# Patient Record
Sex: FEMALE | Race: BLACK OR AFRICAN AMERICAN | NOT HISPANIC OR LATINO
[De-identification: names, ages, dates, MRNs, and addresses within clinical notes are randomized per-mention and may not be internally consistent; named-entity substitution may affect disease eponyms.]

---

## 2017-07-12 ENCOUNTER — APPOINTMENT (OUTPATIENT)
Dept: UROLOGY | Facility: CLINIC | Age: 47
End: 2017-07-12

## 2017-10-12 ENCOUNTER — APPOINTMENT (OUTPATIENT)
Dept: UROLOGY | Facility: CLINIC | Age: 47
End: 2017-10-12
Payer: COMMERCIAL

## 2017-10-12 PROCEDURE — 99214 OFFICE O/P EST MOD 30 MIN: CPT

## 2017-10-13 LAB
APPEARANCE: CLEAR
BACTERIA: NEGATIVE
BILIRUBIN URINE: NEGATIVE
BLOOD URINE: NEGATIVE
COLOR: YELLOW
GLUCOSE QUALITATIVE U: NEGATIVE MG/DL
HYALINE CASTS: 0 /LPF
KETONES URINE: NEGATIVE
LEUKOCYTE ESTERASE URINE: NEGATIVE
MICROSCOPIC-UA: NORMAL
NITRITE URINE: NEGATIVE
PH URINE: 5.5
PROTEIN URINE: NEGATIVE MG/DL
RED BLOOD CELLS URINE: 3 /HPF
SPECIFIC GRAVITY URINE: 1.03
SQUAMOUS EPITHELIAL CELLS: 9 /HPF
UROBILINOGEN URINE: NEGATIVE MG/DL
WHITE BLOOD CELLS URINE: 2 /HPF

## 2017-10-14 LAB — BACTERIA UR CULT: NORMAL

## 2017-10-16 LAB — CORE LAB FLUID CYTOLOGY: NORMAL

## 2018-04-13 ENCOUNTER — EMERGENCY (EMERGENCY)
Facility: HOSPITAL | Age: 48
LOS: 1 days | Discharge: HOME | End: 2018-04-13
Attending: EMERGENCY MEDICINE
Payer: COMMERCIAL

## 2018-04-13 VITALS
WEIGHT: 255.07 LBS | RESPIRATION RATE: 17 BRPM | SYSTOLIC BLOOD PRESSURE: 161 MMHG | HEART RATE: 90 BPM | DIASTOLIC BLOOD PRESSURE: 121 MMHG | HEIGHT: 66 IN | TEMPERATURE: 98 F | OXYGEN SATURATION: 99 %

## 2018-04-13 VITALS
SYSTOLIC BLOOD PRESSURE: 133 MMHG | OXYGEN SATURATION: 98 % | HEART RATE: 71 BPM | TEMPERATURE: 99 F | DIASTOLIC BLOOD PRESSURE: 89 MMHG | RESPIRATION RATE: 18 BRPM

## 2018-04-13 DIAGNOSIS — Z98.890 OTHER SPECIFIED POSTPROCEDURAL STATES: Chronic | ICD-10-CM

## 2018-04-13 LAB
ALBUMIN SERPL ELPH-MCNC: 4.1 G/DL — SIGNIFICANT CHANGE UP (ref 3.3–5)
ALP SERPL-CCNC: 104 U/L — SIGNIFICANT CHANGE UP (ref 40–120)
ALT FLD-CCNC: 11 U/L RC — SIGNIFICANT CHANGE UP (ref 10–45)
ANION GAP SERPL CALC-SCNC: 13 MMOL/L — SIGNIFICANT CHANGE UP (ref 5–17)
AST SERPL-CCNC: 18 U/L — SIGNIFICANT CHANGE UP (ref 10–40)
BASOPHILS # BLD AUTO: 0.1 K/UL — SIGNIFICANT CHANGE UP (ref 0–0.2)
BASOPHILS NFR BLD AUTO: 0.7 % — SIGNIFICANT CHANGE UP (ref 0–2)
BILIRUB SERPL-MCNC: 0.3 MG/DL — SIGNIFICANT CHANGE UP (ref 0.2–1.2)
BUN SERPL-MCNC: 12 MG/DL — SIGNIFICANT CHANGE UP (ref 7–23)
CALCIUM SERPL-MCNC: 9.7 MG/DL — SIGNIFICANT CHANGE UP (ref 8.4–10.5)
CHLORIDE SERPL-SCNC: 104 MMOL/L — SIGNIFICANT CHANGE UP (ref 96–108)
CK MB BLD-MCNC: 1.2 % — SIGNIFICANT CHANGE UP (ref 0–3.5)
CK MB CFR SERPL CALC: 1.4 NG/ML — SIGNIFICANT CHANGE UP (ref 0–3.8)
CK SERPL-CCNC: 121 U/L — SIGNIFICANT CHANGE UP (ref 25–170)
CO2 SERPL-SCNC: 24 MMOL/L — SIGNIFICANT CHANGE UP (ref 22–31)
CREAT SERPL-MCNC: 1 MG/DL — SIGNIFICANT CHANGE UP (ref 0.5–1.3)
EOSINOPHIL # BLD AUTO: 0.2 K/UL — SIGNIFICANT CHANGE UP (ref 0–0.5)
EOSINOPHIL NFR BLD AUTO: 2.5 % — SIGNIFICANT CHANGE UP (ref 0–6)
GLUCOSE SERPL-MCNC: 123 MG/DL — HIGH (ref 70–99)
HCG SERPL-ACNC: <2 MIU/ML — LOW (ref 5–24)
HCT VFR BLD CALC: 35.5 % — SIGNIFICANT CHANGE UP (ref 34.5–45)
HGB BLD-MCNC: 11.7 G/DL — SIGNIFICANT CHANGE UP (ref 11.5–15.5)
LYMPHOCYTES # BLD AUTO: 2.2 K/UL — SIGNIFICANT CHANGE UP (ref 1–3.3)
LYMPHOCYTES # BLD AUTO: 26.6 % — SIGNIFICANT CHANGE UP (ref 13–44)
MCHC RBC-ENTMCNC: 29 PG — SIGNIFICANT CHANGE UP (ref 27–34)
MCHC RBC-ENTMCNC: 33 GM/DL — SIGNIFICANT CHANGE UP (ref 32–36)
MCV RBC AUTO: 87.9 FL — SIGNIFICANT CHANGE UP (ref 80–100)
MONOCYTES # BLD AUTO: 0.6 K/UL — SIGNIFICANT CHANGE UP (ref 0–0.9)
MONOCYTES NFR BLD AUTO: 6.7 % — SIGNIFICANT CHANGE UP (ref 2–14)
NEUTROPHILS # BLD AUTO: 5.3 K/UL — SIGNIFICANT CHANGE UP (ref 1.8–7.4)
NEUTROPHILS NFR BLD AUTO: 63.5 % — SIGNIFICANT CHANGE UP (ref 43–77)
PLATELET # BLD AUTO: 244 K/UL — SIGNIFICANT CHANGE UP (ref 150–400)
POTASSIUM SERPL-MCNC: 3.9 MMOL/L — SIGNIFICANT CHANGE UP (ref 3.5–5.3)
POTASSIUM SERPL-SCNC: 3.9 MMOL/L — SIGNIFICANT CHANGE UP (ref 3.5–5.3)
PROT SERPL-MCNC: 7.6 G/DL — SIGNIFICANT CHANGE UP (ref 6–8.3)
RBC # BLD: 4.03 M/UL — SIGNIFICANT CHANGE UP (ref 3.8–5.2)
RBC # FLD: 13.1 % — SIGNIFICANT CHANGE UP (ref 10.3–14.5)
SODIUM SERPL-SCNC: 141 MMOL/L — SIGNIFICANT CHANGE UP (ref 135–145)
TROPONIN T SERPL-MCNC: <0.01 NG/ML — SIGNIFICANT CHANGE UP (ref 0–0.06)
WBC # BLD: 8.3 K/UL — SIGNIFICANT CHANGE UP (ref 3.8–10.5)
WBC # FLD AUTO: 8.3 K/UL — SIGNIFICANT CHANGE UP (ref 3.8–10.5)

## 2018-04-13 PROCEDURE — 99284 EMERGENCY DEPT VISIT MOD MDM: CPT | Mod: 25

## 2018-04-13 PROCEDURE — 71045 X-RAY EXAM CHEST 1 VIEW: CPT | Mod: 26

## 2018-04-13 PROCEDURE — 80053 COMPREHEN METABOLIC PANEL: CPT

## 2018-04-13 PROCEDURE — 84702 CHORIONIC GONADOTROPIN TEST: CPT

## 2018-04-13 PROCEDURE — 99285 EMERGENCY DEPT VISIT HI MDM: CPT

## 2018-04-13 PROCEDURE — 84484 ASSAY OF TROPONIN QUANT: CPT

## 2018-04-13 PROCEDURE — 82553 CREATINE MB FRACTION: CPT

## 2018-04-13 PROCEDURE — 82550 ASSAY OF CK (CPK): CPT

## 2018-04-13 PROCEDURE — 71045 X-RAY EXAM CHEST 1 VIEW: CPT

## 2018-04-13 PROCEDURE — 85027 COMPLETE CBC AUTOMATED: CPT

## 2018-04-13 RX ORDER — IBUPROFEN 200 MG
800 TABLET ORAL ONCE
Qty: 0 | Refills: 0 | Status: COMPLETED | OUTPATIENT
Start: 2018-04-13 | End: 2018-04-13

## 2018-04-13 RX ORDER — AMLODIPINE BESYLATE 2.5 MG/1
10 TABLET ORAL ONCE
Qty: 0 | Refills: 0 | Status: DISCONTINUED | OUTPATIENT
Start: 2018-04-13 | End: 2018-04-13

## 2018-04-13 RX ADMIN — Medication 30 MILLILITER(S): at 18:44

## 2018-04-13 NOTE — ED PROVIDER NOTE - ATTENDING CONTRIBUTION TO CARE
48 yo F with h/o HTN fibromyalgia, vertebral disc disease, presenting with left sided chest pain, sharp, worse with movement of left arm.  No sob or palpitations.  No CP with exertion or activities.  Recent negative stress test (1/2018).  On exam, well appearing in NAD.  Afebrile.  Lungs CTABL. Cardiac nrl s1/s2 rrr no mgr.  Abdomen soft nt/nd.  ECG obtained, NSR, no ischemic changes.      CP unlikely ACS given atypical presentation.  Initial labs obtained including troponin (pain since 9am; single troponin sufficient).  CXR obtained, no consolidation/ptx/effusion.  Low risk for MACE by HEART score; unlikely PE given PERC negative.  Improving with meds in ED; likely costochondritis or other MSK etiology given presentation.  Pt is stable for dc and continued evaluation and management as outpatient.

## 2018-04-13 NOTE — ED PROVIDER NOTE - MEDICAL DECISION MAKING DETAILS
46yo F c/o of left sided chest pain  imp: most likely radiculopathy, r/o cardiac cause   EKG, CXR, Cardiac enzymes, preg test, pain control

## 2018-04-13 NOTE — ED PROVIDER NOTE - MUSCULOSKELETAL MINIMAL EXAM
Decreased abduction of arm. + left sided chest wall  tenderness, +trap tenderness. 5/5 strength,/TENDERNESS

## 2018-04-13 NOTE — ED PROVIDER NOTE - OBJECTIVE STATEMENT
46yo F with pmhx of HTN, fibromyalgia, multiple Herniated discs ( ambulates with rollade or cane- follows up with neuro & pain management), Renal cell carcinoma (s/p left kidney removal), multiple episodes of Costochondritis, presents to ER for Left sided chest pain withy radiates down her left arm since 9am this morning. Patient reports she has had similar pain and usually 48yo F with pmhx of HTN, fibromyalgia, multiple Herniated discs (ambulates with rollade or cane- follows up with neuro & pain management), Renal cell carcinoma (s/p left kidney removal), multiple episodes of Costochondritis, presents to ER for Left sided non-pleuritic chest pain with radiates down her left arm since 9am this morning.   Patient reports pain started when she woke up and has been intermittent.  states arm feels like pins and needles. Patient reports she has had similar pain in the past and usually takes colchicine and it goes away. States it is hard for her to differential new pain vs pain from hernaited disc.  Pain is worse with movement of left arm and palpation. Patient reports she is trying to get pregnant- requesting pregnancy test, follows [facility]-last appointment last week.  Denies fever, chills, SOB, abdominal pain, N/V, diarrhea, vaginal discharge, vaginal bleeding.

## 2018-04-13 NOTE — ED PROVIDER NOTE - PMH
Essential hypertension    Fibromyalgia    Herniated disc, cervical    Renal cell carcinoma of left kidney

## 2018-04-13 NOTE — ED ADULT NURSE NOTE - OBJECTIVE STATEMENT
47F with new diagnosis of HTN comes to ED c/o chest pain and weakness. States pain started at 10AM and radiates down arm. She states her pain has been constant since 10AM. States she went to go to primary but "could not make it". She denies vomiting/dizziness/fever/chills/abdominal pain/urinary symptoms/SOB/palpitations. Intermittent nausea. she states her pain was stabbing at first, now she states it feels numb. States she has back pain, but no different than her typical back pain. EKG performed immediately, and patient placed on cardiac monitor. Family is at bedside. Will continue to monitor.

## 2018-04-13 NOTE — ED ADULT NURSE NOTE - CHPI ED SYMPTOMS NEG
no diaphoresis/no shortness of breath/no chills/no dizziness/no vomiting/no fever/no cough/no syncope

## 2018-05-28 ENCOUNTER — EMERGENCY (EMERGENCY)
Facility: HOSPITAL | Age: 48
LOS: 0 days | Discharge: HOME | End: 2018-05-29
Attending: STUDENT IN AN ORGANIZED HEALTH CARE EDUCATION/TRAINING PROGRAM
Payer: COMMERCIAL

## 2018-05-28 VITALS
HEART RATE: 67 BPM | SYSTOLIC BLOOD PRESSURE: 150 MMHG | TEMPERATURE: 98 F | HEIGHT: 66 IN | OXYGEN SATURATION: 100 % | WEIGHT: 255.07 LBS | DIASTOLIC BLOOD PRESSURE: 105 MMHG | RESPIRATION RATE: 16 BRPM

## 2018-05-28 DIAGNOSIS — Z98.890 OTHER SPECIFIED POSTPROCEDURAL STATES: Chronic | ICD-10-CM

## 2018-05-28 DIAGNOSIS — Z88.0 ALLERGY STATUS TO PENICILLIN: ICD-10-CM

## 2018-05-28 DIAGNOSIS — G89.29 OTHER CHRONIC PAIN: ICD-10-CM

## 2018-05-28 DIAGNOSIS — M25.572 PAIN IN LEFT ANKLE AND JOINTS OF LEFT FOOT: ICD-10-CM

## 2018-05-28 DIAGNOSIS — M25.571 PAIN IN RIGHT ANKLE AND JOINTS OF RIGHT FOOT: ICD-10-CM

## 2018-05-28 DIAGNOSIS — R53.1 WEAKNESS: ICD-10-CM

## 2018-05-28 DIAGNOSIS — M79.7 FIBROMYALGIA: ICD-10-CM

## 2018-05-28 DIAGNOSIS — I10 ESSENTIAL (PRIMARY) HYPERTENSION: ICD-10-CM

## 2018-05-28 PROCEDURE — 99284 EMERGENCY DEPT VISIT MOD MDM: CPT

## 2018-05-28 PROCEDURE — 73610 X-RAY EXAM OF ANKLE: CPT | Mod: 26,50

## 2018-05-28 PROCEDURE — 73562 X-RAY EXAM OF KNEE 3: CPT | Mod: 26,LT

## 2018-05-28 RX ORDER — ACETAMINOPHEN 500 MG
650 TABLET ORAL ONCE
Qty: 0 | Refills: 0 | Status: COMPLETED | OUTPATIENT
Start: 2018-05-28 | End: 2018-05-28

## 2018-05-28 RX ORDER — LABETALOL HCL 100 MG
100 TABLET ORAL ONCE
Qty: 0 | Refills: 0 | Status: COMPLETED | OUTPATIENT
Start: 2018-05-28 | End: 2018-05-28

## 2018-05-28 RX ADMIN — Medication 100 MILLIGRAM(S): at 22:52

## 2018-05-28 RX ADMIN — Medication 650 MILLIGRAM(S): at 22:52

## 2018-05-28 NOTE — ED ADULT TRIAGE NOTE - CHIEF COMPLAINT QUOTE
Patient reports b/l ankle pain and L- knee pain today, unable to walk in the mall as per EMS. hx htn
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
## 2018-05-28 NOTE — ED PROVIDER NOTE - ATTENDING CONTRIBUTION TO CARE
I have seen and examined this patient and agree with the PA's history , exam and plan of care    47 year old female presented with ankle edema (L>R) today which she notice while walking in the mall, she also reports  swelling of the left knee, pt does have a history of bilateral ankle ligament tears worse on the left knee tears, she has had edema in the past but today is worse than usual, her pain is worse with weight bearing, (-) calf pains (-) chest pain (-) sob (-) recent travel (-) hormonal medications, labs, sono, pain control, workup is negative, pt told to rest and elevate her legs, tylenol for pain and to follow up with her pmd

## 2018-05-28 NOTE — ED PROVIDER NOTE - PHYSICAL EXAMINATION
Gen: Alert, NAD  Head: NC, AT, PERRL, EOMI, normal lids/conjunctiva  ENT: B TM WNL, normal hearing, patent oropharynx without erythema/exudate, uvula midline  Neck: +supple, no tenderness/meningismus/JVD, +Trachea midline  Pulm: Bilateral BS, normal resp effort, no wheeze/stridor/retractions  CV: RRR, no M/R/G, +dist pulses  Abd: soft, NT/ND, +BS, no hepatosplenomegaly  Mskel: no erythema/cyanosis, b/l LE without pitting edema, no erythema, full ROM, sensations intact, str 5/5  Skin: no rash  Neuro: AAOx3, no sensory/motor deficits, CN 2-12 intact

## 2018-05-29 VITALS
DIASTOLIC BLOOD PRESSURE: 83 MMHG | TEMPERATURE: 98 F | HEART RATE: 67 BPM | OXYGEN SATURATION: 100 % | RESPIRATION RATE: 18 BRPM | SYSTOLIC BLOOD PRESSURE: 123 MMHG

## 2018-05-29 LAB
ALBUMIN SERPL ELPH-MCNC: 3.5 G/DL — SIGNIFICANT CHANGE UP (ref 3.3–5)
ALP SERPL-CCNC: 104 U/L — SIGNIFICANT CHANGE UP (ref 40–120)
ALT FLD-CCNC: 16 U/L — SIGNIFICANT CHANGE UP (ref 12–78)
ANION GAP SERPL CALC-SCNC: 9 MMOL/L — SIGNIFICANT CHANGE UP (ref 5–17)
APTT BLD: 38.7 SEC — HIGH (ref 27.5–37.4)
AST SERPL-CCNC: 13 U/L — LOW (ref 15–37)
BASOPHILS # BLD AUTO: 0.03 K/UL — SIGNIFICANT CHANGE UP (ref 0–0.2)
BASOPHILS NFR BLD AUTO: 0.4 % — SIGNIFICANT CHANGE UP (ref 0–2)
BILIRUB SERPL-MCNC: 0.1 MG/DL — LOW (ref 0.2–1.2)
BUN SERPL-MCNC: 11 MG/DL — SIGNIFICANT CHANGE UP (ref 7–23)
CALCIUM SERPL-MCNC: 8.8 MG/DL — SIGNIFICANT CHANGE UP (ref 8.5–10.1)
CHLORIDE SERPL-SCNC: 108 MMOL/L — SIGNIFICANT CHANGE UP (ref 96–108)
CO2 SERPL-SCNC: 25 MMOL/L — SIGNIFICANT CHANGE UP (ref 22–31)
CREAT SERPL-MCNC: 0.85 MG/DL — SIGNIFICANT CHANGE UP (ref 0.5–1.3)
EOSINOPHIL # BLD AUTO: 0.28 K/UL — SIGNIFICANT CHANGE UP (ref 0–0.5)
EOSINOPHIL NFR BLD AUTO: 3.4 % — SIGNIFICANT CHANGE UP (ref 0–6)
GLUCOSE SERPL-MCNC: 112 MG/DL — HIGH (ref 70–99)
HCT VFR BLD CALC: 34.3 % — LOW (ref 34.5–45)
HGB BLD-MCNC: 11.1 G/DL — LOW (ref 11.5–15.5)
IMM GRANULOCYTES NFR BLD AUTO: 0.6 % — SIGNIFICANT CHANGE UP (ref 0–1.5)
INR BLD: 0.97 RATIO — SIGNIFICANT CHANGE UP (ref 0.88–1.16)
LYMPHOCYTES # BLD AUTO: 2.95 K/UL — SIGNIFICANT CHANGE UP (ref 1–3.3)
LYMPHOCYTES # BLD AUTO: 35.6 % — SIGNIFICANT CHANGE UP (ref 13–44)
MCHC RBC-ENTMCNC: 28.5 PG — SIGNIFICANT CHANGE UP (ref 27–34)
MCHC RBC-ENTMCNC: 32.4 GM/DL — SIGNIFICANT CHANGE UP (ref 32–36)
MCV RBC AUTO: 87.9 FL — SIGNIFICANT CHANGE UP (ref 80–100)
MONOCYTES # BLD AUTO: 0.57 K/UL — SIGNIFICANT CHANGE UP (ref 0–0.9)
MONOCYTES NFR BLD AUTO: 6.9 % — SIGNIFICANT CHANGE UP (ref 2–14)
NEUTROPHILS # BLD AUTO: 4.4 K/UL — SIGNIFICANT CHANGE UP (ref 1.8–7.4)
NEUTROPHILS NFR BLD AUTO: 53.1 % — SIGNIFICANT CHANGE UP (ref 43–77)
NRBC # BLD: 0 /100 WBCS — SIGNIFICANT CHANGE UP (ref 0–0)
NT-PROBNP SERPL-SCNC: 123 PG/ML — SIGNIFICANT CHANGE UP (ref 0–125)
PLATELET # BLD AUTO: 246 K/UL — SIGNIFICANT CHANGE UP (ref 150–400)
POTASSIUM SERPL-MCNC: 3.7 MMOL/L — SIGNIFICANT CHANGE UP (ref 3.5–5.3)
POTASSIUM SERPL-SCNC: 3.7 MMOL/L — SIGNIFICANT CHANGE UP (ref 3.5–5.3)
PROT SERPL-MCNC: 7.8 GM/DL — SIGNIFICANT CHANGE UP (ref 6–8.3)
PROTHROM AB SERPL-ACNC: 10.6 SEC — SIGNIFICANT CHANGE UP (ref 9.8–12.7)
RBC # BLD: 3.9 M/UL — SIGNIFICANT CHANGE UP (ref 3.8–5.2)
RBC # FLD: 14.8 % — HIGH (ref 10.3–14.5)
SODIUM SERPL-SCNC: 142 MMOL/L — SIGNIFICANT CHANGE UP (ref 135–145)
WBC # BLD: 8.28 K/UL — SIGNIFICANT CHANGE UP (ref 3.8–10.5)
WBC # FLD AUTO: 8.28 K/UL — SIGNIFICANT CHANGE UP (ref 3.8–10.5)

## 2018-05-29 PROCEDURE — 93970 EXTREMITY STUDY: CPT | Mod: 26

## 2018-05-29 RX ORDER — LABETALOL HCL 100 MG
1 TABLET ORAL
Qty: 0 | Refills: 0 | COMMUNITY

## 2018-05-29 RX ORDER — ESOMEPRAZOLE MAGNESIUM 40 MG/1
1 CAPSULE, DELAYED RELEASE ORAL
Qty: 0 | Refills: 0 | COMMUNITY

## 2018-09-10 PROBLEM — C64.2 MALIGNANT NEOPLASM OF LEFT KIDNEY, EXCEPT RENAL PELVIS: Chronic | Status: ACTIVE | Noted: 2018-04-13

## 2018-09-10 PROBLEM — I10 ESSENTIAL (PRIMARY) HYPERTENSION: Chronic | Status: ACTIVE | Noted: 2018-04-13

## 2018-09-10 PROBLEM — M79.7 FIBROMYALGIA: Chronic | Status: ACTIVE | Noted: 2018-04-13

## 2018-09-10 PROBLEM — M50.20 OTHER CERVICAL DISC DISPLACEMENT, UNSPECIFIED CERVICAL REGION: Chronic | Status: ACTIVE | Noted: 2018-04-13

## 2018-09-10 PROBLEM — I10 ESSENTIAL (PRIMARY) HYPERTENSION: Chronic | Status: ACTIVE | Noted: 2018-05-28

## 2018-09-10 PROBLEM — Z87.19 PERSONAL HISTORY OF OTHER DISEASES OF THE DIGESTIVE SYSTEM: Chronic | Status: ACTIVE | Noted: 2018-05-28

## 2018-09-14 ENCOUNTER — APPOINTMENT (OUTPATIENT)
Dept: UROLOGY | Facility: CLINIC | Age: 48
End: 2018-09-14
Payer: COMMERCIAL

## 2018-09-14 PROCEDURE — 99214 OFFICE O/P EST MOD 30 MIN: CPT

## 2018-09-16 LAB
APPEARANCE: CLEAR
BACTERIA: NEGATIVE
BILIRUBIN URINE: NEGATIVE
BLOOD URINE: NEGATIVE
COLOR: YELLOW
GLUCOSE QUALITATIVE U: NEGATIVE MG/DL
HYALINE CASTS: 2 /LPF
KETONES URINE: ABNORMAL
LEUKOCYTE ESTERASE URINE: NEGATIVE
MICROSCOPIC-UA: NORMAL
NITRITE URINE: NEGATIVE
PH URINE: 5.5
PROTEIN URINE: NEGATIVE MG/DL
RED BLOOD CELLS URINE: 2 /HPF
SPECIFIC GRAVITY URINE: 1.02
SQUAMOUS EPITHELIAL CELLS: 3 /HPF
UROBILINOGEN URINE: NEGATIVE MG/DL
WHITE BLOOD CELLS URINE: 2 /HPF

## 2018-09-17 LAB
BACTERIA UR CULT: ABNORMAL
CORE LAB FLUID CYTOLOGY: NORMAL

## 2019-01-10 ENCOUNTER — APPOINTMENT (OUTPATIENT)
Dept: UROLOGY | Facility: CLINIC | Age: 49
End: 2019-01-10

## 2019-03-23 LAB
APPEARANCE: CLEAR
BACTERIA: NEGATIVE
BILIRUBIN URINE: NEGATIVE
BLOOD URINE: NEGATIVE
COLOR: YELLOW
GLUCOSE QUALITATIVE U: NEGATIVE
HYALINE CASTS: 2 /LPF
KETONES URINE: NEGATIVE
LEUKOCYTE ESTERASE URINE: NEGATIVE
MICROSCOPIC-UA: NORMAL
NITRITE URINE: NEGATIVE
PH URINE: 6
PROTEIN URINE: NEGATIVE
RED BLOOD CELLS URINE: 3 /HPF
SPECIFIC GRAVITY URINE: 1.02
SQUAMOUS EPITHELIAL CELLS: 3 /HPF
UROBILINOGEN URINE: NORMAL
WHITE BLOOD CELLS URINE: 1 /HPF

## 2019-03-25 LAB — BACTERIA UR CULT: NORMAL

## 2019-09-05 ENCOUNTER — APPOINTMENT (OUTPATIENT)
Dept: UROLOGY | Facility: CLINIC | Age: 49
End: 2019-09-05

## 2020-01-15 ENCOUNTER — EMERGENCY (EMERGENCY)
Facility: HOSPITAL | Age: 50
LOS: 1 days | Discharge: HOME | End: 2020-01-15
Attending: EMERGENCY MEDICINE
Payer: COMMERCIAL

## 2020-01-15 VITALS
TEMPERATURE: 98 F | HEART RATE: 71 BPM | OXYGEN SATURATION: 99 % | RESPIRATION RATE: 19 BRPM | WEIGHT: 244.93 LBS | SYSTOLIC BLOOD PRESSURE: 138 MMHG | DIASTOLIC BLOOD PRESSURE: 88 MMHG | HEIGHT: 66 IN

## 2020-01-15 VITALS
OXYGEN SATURATION: 100 % | TEMPERATURE: 98 F | RESPIRATION RATE: 18 BRPM | DIASTOLIC BLOOD PRESSURE: 81 MMHG | SYSTOLIC BLOOD PRESSURE: 141 MMHG | HEART RATE: 73 BPM

## 2020-01-15 DIAGNOSIS — Z98.890 OTHER SPECIFIED POSTPROCEDURAL STATES: Chronic | ICD-10-CM

## 2020-01-15 LAB
ALBUMIN SERPL ELPH-MCNC: 4.3 G/DL — SIGNIFICANT CHANGE UP (ref 3.3–5)
ALP SERPL-CCNC: 106 U/L — SIGNIFICANT CHANGE UP (ref 40–120)
ALT FLD-CCNC: 14 U/L — SIGNIFICANT CHANGE UP (ref 10–45)
ANION GAP SERPL CALC-SCNC: 11 MMOL/L — SIGNIFICANT CHANGE UP (ref 5–17)
AST SERPL-CCNC: 15 U/L — SIGNIFICANT CHANGE UP (ref 10–40)
BILIRUB SERPL-MCNC: 0.2 MG/DL — SIGNIFICANT CHANGE UP (ref 0.2–1.2)
BUN SERPL-MCNC: 18 MG/DL — SIGNIFICANT CHANGE UP (ref 7–23)
CALCIUM SERPL-MCNC: 10.2 MG/DL — SIGNIFICANT CHANGE UP (ref 8.4–10.5)
CHLORIDE SERPL-SCNC: 102 MMOL/L — SIGNIFICANT CHANGE UP (ref 96–108)
CO2 SERPL-SCNC: 27 MMOL/L — SIGNIFICANT CHANGE UP (ref 22–31)
CREAT SERPL-MCNC: 0.89 MG/DL — SIGNIFICANT CHANGE UP (ref 0.5–1.3)
GLUCOSE SERPL-MCNC: 146 MG/DL — HIGH (ref 70–99)
HCT VFR BLD CALC: 38.1 % — SIGNIFICANT CHANGE UP (ref 34.5–45)
HGB BLD-MCNC: 11.9 G/DL — SIGNIFICANT CHANGE UP (ref 11.5–15.5)
MCHC RBC-ENTMCNC: 27.9 PG — SIGNIFICANT CHANGE UP (ref 27–34)
MCHC RBC-ENTMCNC: 31.2 GM/DL — LOW (ref 32–36)
MCV RBC AUTO: 89.4 FL — SIGNIFICANT CHANGE UP (ref 80–100)
NRBC # BLD: 0 /100 WBCS — SIGNIFICANT CHANGE UP (ref 0–0)
PLATELET # BLD AUTO: 234 K/UL — SIGNIFICANT CHANGE UP (ref 150–400)
POTASSIUM SERPL-MCNC: 4.2 MMOL/L — SIGNIFICANT CHANGE UP (ref 3.5–5.3)
POTASSIUM SERPL-SCNC: 4.2 MMOL/L — SIGNIFICANT CHANGE UP (ref 3.5–5.3)
PROT SERPL-MCNC: 8 G/DL — SIGNIFICANT CHANGE UP (ref 6–8.3)
RBC # BLD: 4.26 M/UL — SIGNIFICANT CHANGE UP (ref 3.8–5.2)
RBC # FLD: 13.8 % — SIGNIFICANT CHANGE UP (ref 10.3–14.5)
SODIUM SERPL-SCNC: 140 MMOL/L — SIGNIFICANT CHANGE UP (ref 135–145)
TROPONIN T, HIGH SENSITIVITY RESULT: <6 NG/L — SIGNIFICANT CHANGE UP (ref 0–51)
WBC # BLD: 8.88 K/UL — SIGNIFICANT CHANGE UP (ref 3.8–10.5)
WBC # FLD AUTO: 8.88 K/UL — SIGNIFICANT CHANGE UP (ref 3.8–10.5)

## 2020-01-15 PROCEDURE — 93010 ELECTROCARDIOGRAM REPORT: CPT

## 2020-01-15 PROCEDURE — 71046 X-RAY EXAM CHEST 2 VIEWS: CPT

## 2020-01-15 PROCEDURE — 99285 EMERGENCY DEPT VISIT HI MDM: CPT

## 2020-01-15 PROCEDURE — 85027 COMPLETE CBC AUTOMATED: CPT

## 2020-01-15 PROCEDURE — 84484 ASSAY OF TROPONIN QUANT: CPT

## 2020-01-15 PROCEDURE — 71046 X-RAY EXAM CHEST 2 VIEWS: CPT | Mod: 26

## 2020-01-15 PROCEDURE — 80053 COMPREHEN METABOLIC PANEL: CPT

## 2020-01-15 PROCEDURE — 99284 EMERGENCY DEPT VISIT MOD MDM: CPT | Mod: 25

## 2020-01-15 PROCEDURE — 93005 ELECTROCARDIOGRAM TRACING: CPT

## 2020-01-15 RX ORDER — SODIUM CHLORIDE 9 MG/ML
1000 INJECTION INTRAMUSCULAR; INTRAVENOUS; SUBCUTANEOUS ONCE
Refills: 0 | Status: COMPLETED | OUTPATIENT
Start: 2020-01-15 | End: 2020-01-15

## 2020-01-15 RX ORDER — ACETAMINOPHEN 500 MG
975 TABLET ORAL ONCE
Refills: 0 | Status: COMPLETED | OUTPATIENT
Start: 2020-01-15 | End: 2020-01-15

## 2020-01-15 RX ADMIN — Medication 975 MILLIGRAM(S): at 22:41

## 2020-01-15 RX ADMIN — SODIUM CHLORIDE 1000 MILLILITER(S): 9 INJECTION INTRAMUSCULAR; INTRAVENOUS; SUBCUTANEOUS at 23:33

## 2020-01-15 NOTE — ED PROVIDER NOTE - NSFOLLOWUPINSTRUCTIONS_ED_ALL_ED_FT
1- Rest, drink plenty of fluids  2- Follow up with your doctor or our medicine clinic to discuss you hypertensive medications [phone]  3- Tylenol as needed for pain  4- If you have any worsening pain, shortness of breath, numbness, weakness, or any other concerns return to ER immediately

## 2020-01-15 NOTE — ED PROVIDER NOTE - ENMT, MLM
Airway patent, Nasal mucosa clear. Mouth with normal mucosa. Throat has no vesicles, no oropharyngeal exudates and uvula is midline.
[FreeTextEntry1] : WBC 3,100, Hgb 15.0, Hct 49.1, Plts 80,000, Diff  66P, 17L, 16M, 1Eo, , ANC 2,100\par \par \par \par 06/19/19\par CMP:  Glu 168, BUN 44, Creatinine 1.88, eGFR 33\par \par Mg 2.0\par Cyclosporine: 193\par

## 2020-01-15 NOTE — ED PROVIDER NOTE - OBJECTIVE STATEMENT
49 y.o. female coming in with some fatigue, left sided chest discomfort and a headache.  Pt was at PT today, was not straining when she did not feel well, they checked her pressure and it was 170/110 they told her to come to the ED.  Upon arriving in triage pt started with some left sided chest soreness worse with palpation, typical of her costochondritis pain (nothing makes it better).  Also Is noting some fatigue and light-headiness.  No cough, no fevers, no chills.  + recent URI.  Took a dose of her Labetalol prior to arrival which helped her pressure.

## 2020-01-15 NOTE — ED PROVIDER NOTE - ATTENDING CONTRIBUTION TO CARE
attending Benedict: 49yF h/o pericarditis, solitary kidney, costochondritis, HTN no longer on antihypertensives p/w several hours generalized weakness and mild L sided chest pain. Reports elevated BP earlier today during physical therapy after which she took her labetalol. Reports pain similar to prior episode of costochondritis. +preceding URI. Will obtain ekg, place on tele, labs including trop, cxr, symptomatic treatment, reassess

## 2020-01-15 NOTE — ED PROVIDER NOTE - PMH
Essential hypertension    Fibromyalgia    H/O gastroesophageal reflux (GERD)    Herniated disc, cervical    HTN (hypertension)    Renal cell carcinoma of left kidney

## 2020-01-15 NOTE — ED ADULT NURSE NOTE - OBJECTIVE STATEMENT
48y/o female with history of renal carcinoma s/p removal of kidney, HTN, walked into ED a&ox3 c/o high blood pressure. Patient reports she was at physical therapy today when she suddenly felt "unwell". States at this time her BP was 170/110. Patient currently c/o of some left sided chest discomfort and HA. Denies SOB, diaphoresis, fever, cough, recent travel, swelling to legs, dizziness, numbness/tingling. Lungs clear b/l. Abd soft, nontender, nondistended. MENCHACA x4. EKG done in triage, patient on CM. MD Mcmullen at bedside.

## 2020-01-15 NOTE — ED ADULT NURSE NOTE - NSIMPLEMENTINTERV_GEN_ALL_ED
Implemented All Fall Risk Interventions:  [place] to call system. Call bell, personal items and telephone within reach. Instruct patient to call for assistance. Room bathroom lighting operational. Non-slip footwear when patient is off stretcher. Physically safe environment: no spills, clutter or unnecessary equipment. Stretcher in lowest position, wheels locked, appropriate side rails in place. Provide visual cue, wrist band, yellow gown, etc. Monitor gait and stability. Monitor for mental status changes and reorient to person, place, and time. Review medications for side effects contributing to fall risk. Reinforce activity limits and safety measures with patient and family.

## 2020-01-15 NOTE — ED PROVIDER NOTE - PATIENT PORTAL LINK FT
You can access the FollowMyHealth Patient Portal offered by [facility] by registering at the following website: [email or web address]. By joining [facility]’s FollowMyHealth portal, you will also be able to view your health information using other applications (apps) compatible with our system.

## 2020-01-29 ENCOUNTER — APPOINTMENT (OUTPATIENT)
Dept: UROLOGY | Facility: CLINIC | Age: 50
End: 2020-01-29
Payer: COMMERCIAL

## 2020-01-29 DIAGNOSIS — N30.90 CYSTITIS, UNSPECIFIED W/OUT HEMATURIA: ICD-10-CM

## 2020-01-29 PROCEDURE — 99214 OFFICE O/P EST MOD 30 MIN: CPT

## 2020-01-29 NOTE — PHYSICAL EXAM
[General Appearance - Well Developed] : well developed [Normal Appearance] : normal appearance [General Appearance - Well Nourished] : well nourished [General Appearance - In No Acute Distress] : no acute distress [Well Groomed] : well groomed [Abdomen Soft] : soft [Costovertebral Angle Tenderness] : no ~M costovertebral angle tenderness [Abdomen Tenderness] : non-tender [Urinary Bladder Findings] : the bladder was normal on palpation [] : no respiratory distress [Edema] : no peripheral edema [Exaggerated Use Of Accessory Muscles For Inspiration] : no accessory muscle use [Oriented To Time, Place, And Person] : oriented to person, place, and time [Respiration, Rhythm And Depth] : normal respiratory rhythm and effort [Mood] : the mood was normal [Affect] : the affect was normal [Not Anxious] : not anxious [Normal Station and Gait] : the gait and station were normal for the patient's age [No Palpable Adenopathy] : no palpable adenopathy [No Focal Deficits] : no focal deficits

## 2020-01-30 LAB
APPEARANCE: CLEAR
BACTERIA UR CULT: NORMAL
BACTERIA: NEGATIVE
BILIRUBIN URINE: NEGATIVE
BLOOD URINE: NEGATIVE
COLOR: NORMAL
GLUCOSE QUALITATIVE U: NEGATIVE
HYALINE CASTS: 3 /LPF
KETONES URINE: NEGATIVE
LEUKOCYTE ESTERASE URINE: NEGATIVE
MICROSCOPIC-UA: NORMAL
NITRITE URINE: NEGATIVE
PH URINE: 6
PROTEIN URINE: NORMAL
RED BLOOD CELLS URINE: 1 /HPF
SPECIFIC GRAVITY URINE: 1.02
SQUAMOUS EPITHELIAL CELLS: 2 /HPF
UROBILINOGEN URINE: NORMAL
WHITE BLOOD CELLS URINE: 1 /HPF

## 2020-10-13 ENCOUNTER — APPOINTMENT (OUTPATIENT)
Dept: UROLOGY | Facility: CLINIC | Age: 50
End: 2020-10-13
Payer: COMMERCIAL

## 2020-10-13 VITALS
WEIGHT: 242 LBS | TEMPERATURE: 98 F | BODY MASS INDEX: 41.32 KG/M2 | HEIGHT: 64 IN | HEART RATE: 81 BPM | SYSTOLIC BLOOD PRESSURE: 127 MMHG | DIASTOLIC BLOOD PRESSURE: 84 MMHG

## 2020-10-13 PROCEDURE — 99214 OFFICE O/P EST MOD 30 MIN: CPT

## 2020-10-13 NOTE — ED ADULT NURSE NOTE - CAS TRG GEN SKIN COLOR
[facility] Emergency Dept  [address]  [facility] [address]  Phone: [phone]  Fax: [phone]                                    Moriah Hudson   MRN: [id number]    Department: [facility] Emergency Dept   Date of Visit: 10/13/2020           After Visit Summary Signature Page    I have received my discharge instructions, and my questions have been answered. I have discussed any challenges I see with this plan with the nurse or doctor.    ..........................................................................................................................................  Patient/Patient Representative Signature      ..........................................................................................................................................  Patient Representative Print Name and Relationship to Patient    ..................................................               ................................................  Date                                   Time    ..........................................................................................................................................  Reviewed by Signature/Title    ...................................................              ..............................................  Date                                               Time          22EPIC Rev 08/18       
Normal for race

## 2021-06-16 ENCOUNTER — APPOINTMENT (OUTPATIENT)
Dept: UROLOGY | Facility: CLINIC | Age: 51
End: 2021-06-16
Payer: MEDICARE

## 2021-06-16 PROCEDURE — 99072 ADDL SUPL MATRL&STAF TM PHE: CPT

## 2021-06-16 PROCEDURE — 99213 OFFICE O/P EST LOW 20 MIN: CPT

## 2021-06-17 LAB
APPEARANCE: CLEAR
BACTERIA: NEGATIVE
BILIRUBIN URINE: NEGATIVE
BLOOD URINE: NEGATIVE
COLOR: NORMAL
GLUCOSE QUALITATIVE U: NEGATIVE
HYALINE CASTS: 2 /LPF
KETONES URINE: NEGATIVE
LEUKOCYTE ESTERASE URINE: ABNORMAL
MICROSCOPIC-UA: NORMAL
NITRITE URINE: NEGATIVE
PH URINE: 6
PROTEIN URINE: NORMAL
RED BLOOD CELLS URINE: 2 /HPF
SPECIFIC GRAVITY URINE: 1.03
SQUAMOUS EPITHELIAL CELLS: 5 /HPF
UROBILINOGEN URINE: NORMAL
WHITE BLOOD CELLS URINE: 2 /HPF

## 2021-06-21 LAB — URINE CYTOLOGY: NORMAL

## 2022-05-19 NOTE — ED PROVIDER NOTE - PR
993 Complex Repair And Epidermal Autograft Text: The defect edges were debeveled with a #15 scalpel blade.  The primary defect was closed partially with a complex linear closure.  Given the location of the defect, shape of the defect and the proximity to free margins an epidermal autograft was deemed most appropriate to repair the remaining defect.  The graft was trimmed to fit the size of the remaining defect.  The graft was then placed in the primary defect, oriented appropriately, and sutured into place.

## 2022-06-15 ENCOUNTER — APPOINTMENT (OUTPATIENT)
Dept: UROLOGY | Facility: CLINIC | Age: 52
End: 2022-06-15
Payer: MEDICARE

## 2022-06-15 LAB
APPEARANCE: CLEAR
BACTERIA: NEGATIVE
BILIRUBIN URINE: NEGATIVE
BLOOD URINE: NEGATIVE
COLOR: NORMAL
GLUCOSE QUALITATIVE U: NEGATIVE
HYALINE CASTS: 1 /LPF
KETONES URINE: NEGATIVE
LEUKOCYTE ESTERASE URINE: NEGATIVE
MICROSCOPIC-UA: NORMAL
NITRITE URINE: NEGATIVE
PH URINE: 6
PROTEIN URINE: NEGATIVE
RED BLOOD CELLS URINE: 1 /HPF
SPECIFIC GRAVITY URINE: 1.02
SQUAMOUS EPITHELIAL CELLS: 3 /HPF
UROBILINOGEN URINE: NORMAL
WHITE BLOOD CELLS URINE: 2 /HPF

## 2022-06-15 PROCEDURE — 99213 OFFICE O/P EST LOW 20 MIN: CPT

## 2022-06-16 LAB — BACTERIA UR CULT: NORMAL

## 2022-07-19 ENCOUNTER — APPOINTMENT (OUTPATIENT)
Dept: ORTHOPEDIC SURGERY | Facility: CLINIC | Age: 52
End: 2022-07-19

## 2022-07-19 VITALS — BODY MASS INDEX: 42.75 KG/M2 | WEIGHT: 266 LBS | HEIGHT: 66 IN

## 2022-07-19 DIAGNOSIS — S83.92XA SPRAIN OF UNSPECIFIED SITE OF LEFT KNEE, INITIAL ENCOUNTER: ICD-10-CM

## 2022-07-19 DIAGNOSIS — S83.91XA SPRAIN OF UNSPECIFIED SITE OF RIGHT KNEE, INITIAL ENCOUNTER: ICD-10-CM

## 2022-07-19 DIAGNOSIS — I10 ESSENTIAL (PRIMARY) HYPERTENSION: ICD-10-CM

## 2022-07-19 PROCEDURE — 20611 DRAIN/INJ JOINT/BURSA W/US: CPT | Mod: 50

## 2022-07-19 PROCEDURE — 99214 OFFICE O/P EST MOD 30 MIN: CPT | Mod: 25

## 2022-07-19 PROCEDURE — 73562 X-RAY EXAM OF KNEE 3: CPT | Mod: 50

## 2022-07-19 NOTE — HISTORY OF PRESENT ILLNESS
[10] : 10 [Constant] : constant [de-identified] : 7/19/22: bilateral knee pain back again.  right shoulder pain worse again. no new injury. [FreeTextEntry1] : both knees, both shoulders [de-identified] : physical therapy

## 2022-07-19 NOTE — PHYSICAL EXAM
[4 ___] : forward flexion 4[unfilled]/5 [Left] : left knee [NL (0)] : extension 0 degrees [4___] : quadriceps 4[unfilled]/5 [5___] : hamstring 5[unfilled]/5 [Right] : right knee [Degenerative change] : Degenerative change [] : no swelling [TWNoteComboBox7] : flexion 120 degrees
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
## 2022-07-19 NOTE — PROCEDURE
[Large Joint Injection] : Large joint injection [Bilateral] : bilaterally of the [Knee] : knee [Pain] : pain [Alcohol] : alcohol [Betadine] : betadine [Orthovisc] : Orthovisc [#1] : series #1 [] : Patient tolerated procedure well [Call if redness, pain or fever occur] : call if redness, pain or fever occur [Apply ice for 15min out of every hour for the next 12-24 hours as tolerated] : apply ice for 15 minutes out of every hour for the next 12-24 hours as tolerated [Patient was advised to rest the joint(s) for ____ days] : patient was advised to rest the joint(s) for [unfilled] days [Previous OTC use and PT nontherapeutic] : patient has tried OTC's including aspirin, Ibuprofen, Aleve, etc or prescription NSAIDS, and/or exercises at home and/or physical therapy without satisfactory response [Patient had decreased mobility in the joint] : patient had decreased mobility in the joint [Risks, benefits, alternatives discussed / Verbal consent obtained] : the risks benefits, and alternatives have been discussed, and verbal consent was obtained [Prior failure or difficult injection] : prior failure or difficult injection [All ultrasound images have been permanently captured and stored accordingly in our picture archiving and communication system] : All ultrasound images have been permanently captured and stored accordingly in our picture archiving and communication system [Visualization of the needle and placement of injection was performed without complication] : visualization of the needle and placement of injection was performed without complication [Inflammation] : inflammation

## 2023-03-21 ENCOUNTER — EMERGENCY (EMERGENCY)
Facility: HOSPITAL | Age: 53
LOS: 1 days | Discharge: HOME | End: 2023-03-21
Attending: EMERGENCY MEDICINE
Payer: COMMERCIAL

## 2023-03-21 VITALS
TEMPERATURE: 99 F | WEIGHT: 268.08 LBS | DIASTOLIC BLOOD PRESSURE: 106 MMHG | OXYGEN SATURATION: 99 % | HEART RATE: 66 BPM | RESPIRATION RATE: 20 BRPM | HEIGHT: 66 IN | SYSTOLIC BLOOD PRESSURE: 149 MMHG

## 2023-03-21 VITALS
SYSTOLIC BLOOD PRESSURE: 137 MMHG | HEART RATE: 70 BPM | RESPIRATION RATE: 16 BRPM | OXYGEN SATURATION: 98 % | TEMPERATURE: 99 F | DIASTOLIC BLOOD PRESSURE: 81 MMHG

## 2023-03-21 DIAGNOSIS — Z98.890 OTHER SPECIFIED POSTPROCEDURAL STATES: Chronic | ICD-10-CM

## 2023-03-21 PROCEDURE — 72125 CT NECK SPINE W/O DYE: CPT | Mod: 26,MA

## 2023-03-21 PROCEDURE — 73502 X-RAY EXAM HIP UNI 2-3 VIEWS: CPT | Mod: 26,RT

## 2023-03-21 PROCEDURE — 73502 X-RAY EXAM HIP UNI 2-3 VIEWS: CPT

## 2023-03-21 PROCEDURE — 74176 CT ABD & PELVIS W/O CONTRAST: CPT | Mod: MA

## 2023-03-21 PROCEDURE — 72125 CT NECK SPINE W/O DYE: CPT | Mod: MA

## 2023-03-21 PROCEDURE — 99284 EMERGENCY DEPT VISIT MOD MDM: CPT

## 2023-03-21 PROCEDURE — 72128 CT CHEST SPINE W/O DYE: CPT | Mod: 26,MA

## 2023-03-21 PROCEDURE — 72131 CT LUMBAR SPINE W/O DYE: CPT | Mod: 26,MA

## 2023-03-21 PROCEDURE — 71250 CT THORAX DX C-: CPT | Mod: MA

## 2023-03-21 PROCEDURE — 73552 X-RAY EXAM OF FEMUR 2/>: CPT

## 2023-03-21 PROCEDURE — 99284 EMERGENCY DEPT VISIT MOD MDM: CPT | Mod: 25

## 2023-03-21 PROCEDURE — 73552 X-RAY EXAM OF FEMUR 2/>: CPT | Mod: 26,RT

## 2023-03-21 PROCEDURE — 71250 CT THORAX DX C-: CPT | Mod: 26,MA

## 2023-03-21 PROCEDURE — 74176 CT ABD & PELVIS W/O CONTRAST: CPT | Mod: 26,MA

## 2023-03-21 PROCEDURE — 70450 CT HEAD/BRAIN W/O DYE: CPT | Mod: 26,MA

## 2023-03-21 PROCEDURE — 70450 CT HEAD/BRAIN W/O DYE: CPT | Mod: MA

## 2023-03-21 RX ORDER — LIDOCAINE 4 G/100G
1 CREAM TOPICAL ONCE
Refills: 0 | Status: COMPLETED | OUTPATIENT
Start: 2023-03-21 | End: 2023-03-21

## 2023-03-21 RX ORDER — CYCLOBENZAPRINE HYDROCHLORIDE 10 MG/1
10 TABLET, FILM COATED ORAL ONCE
Refills: 0 | Status: COMPLETED | OUTPATIENT
Start: 2023-03-21 | End: 2023-03-21

## 2023-03-21 RX ORDER — CYCLOBENZAPRINE HYDROCHLORIDE 10 MG/1
1 TABLET, FILM COATED ORAL
Qty: 9 | Refills: 0
Start: 2023-03-21 | End: 2023-03-23

## 2023-03-21 RX ORDER — ACETAMINOPHEN 500 MG
975 TABLET ORAL ONCE
Refills: 0 | Status: COMPLETED | OUTPATIENT
Start: 2023-03-21 | End: 2023-03-21

## 2023-03-21 RX ADMIN — Medication 975 MILLIGRAM(S): at 18:16

## 2023-03-21 RX ADMIN — LIDOCAINE 1 PATCH: 4 CREAM TOPICAL at 19:45

## 2023-03-21 RX ADMIN — Medication 975 MILLIGRAM(S): at 17:02

## 2023-03-21 RX ADMIN — LIDOCAINE 1 PATCH: 4 CREAM TOPICAL at 18:14
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
## 2023-03-21 NOTE — ED PROVIDER NOTE - NSICDXPASTMEDICALHX_GEN_ALL_CORE_FT
PAST MEDICAL HISTORY:  Essential hypertension     Fibromyalgia     H/O gastroesophageal reflux (GERD)     Herniated disc, cervical     HTN (hypertension)     Renal cell carcinoma of left kidney
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
## 2023-03-21 NOTE — ED PROVIDER NOTE - PATIENT PORTAL LINK FT
You can access the FollowMyHealth Patient Portal offered by [facility] by registering at the following website: [email or web address]. By joining [facility]’s FollowMyHealth portal, you will also be able to view your health information using other applications (apps) compatible with our system.

## 2023-03-21 NOTE — ED PROVIDER NOTE - NS ED ATTENDING STATEMENT MOD
This was a shared visit with the HERB. I reviewed and verified the documentation and independently performed the documented:
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
## 2023-03-21 NOTE — ED ADULT NURSE NOTE - NSIMPLEMENTINTERV_GEN_ALL_ED
Susan Hahn:  I spoke with pt today. He was recently D/C'd from [facility]. Requesting a refill of cough med. Implemented All Fall Risk Interventions:  [place] to call system. Call bell, personal items and telephone within reach. Instruct patient to call for assistance. Room bathroom lighting operational. Non-slip footwear when patient is off stretcher. Physically safe environment: no spills, clutter or unnecessary equipment. Stretcher in lowest position, wheels locked, appropriate side rails in place. Provide visual cue, wrist band, yellow gown, etc. Monitor gait and stability. Monitor for mental status changes and reorient to person, place, and time. Review medications for side effects contributing to fall risk. Reinforce activity limits and safety measures with patient and family.
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
## 2023-03-21 NOTE — ED PROVIDER NOTE - CPE EDP [facility]
Subjective  Asif Rosenberg is a 63year old male. Chief Complaint   Patient presents with   â¢ Sinus Problem   â¢ Convey Results     Diabetes   He presents for his follow-up diabetic visit. He has type 2 diabetes mellitus. His disease course has been stable. There are no hypoglycemic associated symptoms. Pertinent negatives for hypoglycemia include no headaches. There are no diabetic associated symptoms. Pertinent negatives for diabetes include no blurred vision and no chest pain. There are no hypoglycemic complications. Hypertension   This is a chronic problem. Pertinent negatives include no blurred vision, chest pain, headaches, palpitations, peripheral edema, PND or shortness of breath. Past treatments include angiotensin blockers. Hyperlipidemia   This is a chronic problem. Exacerbating diseases include diabetes. Pertinent negatives include no chest pain, focal weakness, leg pain, myalgias or shortness of breath. Current antihyperlipidemic treatment includes statins. Past Medical History  No past medical history on file. Past Surgical History  No past surgical history on file. Current Medications  Current Outpatient Medications   Medication Sig Dispense Refill   â¢ atorvastatin (LIPITOR) 20 MG tablet Take 1 tablet by mouth daily. â¢ losartan (COZAAR) 50 MG tablet Take 1 tablet by mouth daily. â¢ metFORMIN (GLUCOPHAGE) 500 MG tablet Take 1 tablet by mouth 2 times daily (with meals). â¢ fluticasone (FLONASE) 50 MCG/ACT nasal spray Spray 2 sprays in each nostril daily. 16 g 6   â¢ cholecalciferol (VITAMIN D3) 1000 UNITS tablet Take 1 tablet by mouth daily. 90 tablet 3     No current facility-administered medications for this visit. Allergies  ALLERGIES:  No Known Allergies    Family History  No family history on file. Social History  Social History     Tobacco Use   â¢ Smoking status: Former Smoker   â¢ Smokeless tobacco: Former User   Substance Use Topics   â¢ Alcohol use:  No "Frequency: Never   â¢ Drug use: No       Review of Systems   Eyes: Negative for blurred vision. Respiratory: Negative for shortness of breath. Cardiovascular: Negative for chest pain, palpitations and PND. Musculoskeletal: Negative for myalgias. Neurological: Negative for focal weakness and headaches. All other systems reviewed and are negative. Objective  Visit Vitals  /90   Pulse 69   Temp 98.2 Â°F (36.8 Â°C)   Resp 16   Ht 5' 7"" (1.702 m)   Wt 110.3 kg (243 lb 2.7 oz)   BMI 38.09 kg/mÂ²       Physical Exam   Constitutional: He appears well-developed. HENT:   Head: Normocephalic. Eyes: Conjunctivae and EOM are normal. Pupils are equal, round, and reactive to light. No scleral icterus. Cardiovascular: Normal rate, regular rhythm, normal heart sounds and intact distal pulses. Exam reveals no gallop and no friction rub. No murmur heard. Pulmonary/Chest: Effort normal and breath sounds normal. No respiratory distress. He has no wheezes. He has no rales. He exhibits no tenderness. Abdominal: Soft. Bowel sounds are normal. He exhibits no distension and no mass. There is no tenderness. There is no rebound and no guarding.    Foot exam: {PWIM DBM DM FEET:[id number]::\""extremities are without significant edema\"",\""no significant lesions are appreciated\"",\""multiple areas on both feet are intact to a 10 gram filament\"", good pedal pulses       Labs  Lab Services on 11/29/2018   Component Date Value Ref Range Status   â¢ Fasting Status 11/29/2018 UNKNOWN  hrs Final   â¢ Sodium 11/29/2018 141  135 - 145 mmol/L Final   â¢ Potassium 11/29/2018 4.4  3.4 - 5.1 mmol/L Final   â¢ Chloride 11/29/2018 106  98 - 107 mmol/L Final   â¢ Carbon Dioxide 11/29/2018 27  21 - 32 mmol/L Final   â¢ Anion Gap 11/29/2018 12  10 - 20 mmol/L Final   â¢ Glucose 11/29/2018 106* 65 - 99 mg/dL Final   â¢ BUN 11/29/2018 14  6 - 20 mg/dL Final   â¢ Creatinine 11/29/2018 0.92  0.67 - 1.17 mg/dL Final   â¢ GFR Estimate,  11/29/2018 " >90   Final    eGFR results = or >90 mL/min/1.73m2 = Normal kidney function. â¢ GFR Estimate, Non  11/29/2018 >90   Final    eGFR results = or >90 mL/min/1.73m2 = Normal kidney function.    â¢ BUN/Creatinine Ratio 11/29/2018 15  7 - 25 Final   â¢ CALCIUM 11/29/2018 9.1  8.4 - 10.2 mg/dL Final   â¢ TOTAL BILIRUBIN 11/29/2018 0.5  0.2 - 1.0 mg/dL Final   â¢ AST/SGOT 11/29/2018 14  <38 Units/L Final   â¢ ALT/SGPT 11/29/2018 27  <79 Units/L Final   â¢ ALK PHOSPHATASE 11/29/2018 48  45 - 117 Units/L Final   â¢ TOTAL PROTEIN 11/29/2018 7.5  6.4 - 8.2 g/dL Final   â¢ Albumin 11/29/2018 4.0  3.6 - 5.1 g/dL Final   â¢ GLOBULIN 11/29/2018 3.5  2.0 - 4.0 g/dL Final   â¢ A/G Ratio, Serum 11/29/2018 1.1  1.0 - 2.4 Final   â¢ COLOR 11/29/2018 YELLOW  YELLOW Final   â¢ APPEARANCE 11/29/2018 CLEAR   Final   â¢ GLUCOSE(URINE) 11/29/2018 NEGATIVE  NEGATIVE mg/dL Final   â¢ BILIRUBIN 11/29/2018 NEGATIVE  NEGATIVE Final   â¢ KETONES 11/29/2018 NEGATIVE  NEGATIVE mg/dL Final   â¢ SPECIFIC GRAVITY 11/29/2018 1.016  1.005 - 1.030 Final   â¢ BLOOD 11/29/2018 SMALL* NEGATIVE Final   â¢ pH 11/29/2018 6.0  5.0 - 7.0 Units Final   â¢ PROTEIN(URINE) 11/29/2018 NEGATIVE  NEGATIVE mg/dL Final   â¢ UROBILINOGEN  11/29/2018 0.2  0.0 - 1.0 mg/dL Final   â¢ NITRITE 11/29/2018 NEGATIVE  NEGATIVE Final   â¢ LEUKOCYTE ESTERASE 11/29/2018 NEGATIVE  NEGATIVE Final   â¢ Squamous EPI'S 11/29/2018 1 to 5  0 - 5 /hpf Final   â¢ RBC 11/29/2018 1 to 3  0 - 3 /hpf Final   â¢ WBC 11/29/2018 1 to 5  0 - 5 /hpf Final   â¢ BACTERIA 11/29/2018 NONE SEEN  NONE SEEN /hpf Final   â¢ Hyaline Casts 11/29/2018 NONE SEEN  0 - 5 /lpf Final   â¢ SPECIMEN TYPE 11/29/2018 URINE, CLEAN CATCH/MIDSTREAM   Final   â¢ MUCOUS 11/29/2018 PRESENT   Final   â¢ MICROALBUMIN, UA (TTL) 11/29/2018 0.53  mg/dL Final   â¢ CREATININE, URINE (TOTAL) 11/29/2018 137.00  mg/dL Final   â¢ MICROALBUMIN/CREATININE 11/29/2018 3.9  <30 mg/g Final       I      Procedures  Procedures    Assessment and Plan  [facility] was seen today for sinus problem and convey results. Diagnoses and all orders for this visit:    Allergic rhinitis, unspecified seasonality, unspecified trigger              Continue fluticasone nasal spray, refills provided    Benign essential HTN, controlled, continue present management  -     COMPREHENSIVE METABOLIC PANEL; Future  -     CBC & AUTO DIFFERENTIAL; Future  -     THYROID STIMULATING HORMONE REFLEX; Future  -     URINALYSIS & REFLEX MICRO; Future    Severe obesity (BMI 35.0-39. 9) with comorbidity (CMS/HCC)  -     THYROID STIMULATING HORMONE REFLEX; Future               Advised weight loss, low carbohydrate diet and regular exercises    Vitamin D deficiency, controlled  -     CBC & AUTO DIFFERENTIAL; Future  -     VITAMIN D -25 HYDROXY; Future    Hyperlipidemia, unspecified hyperlipidemia type, controlled, continue atorvastatin 20 mg daily  -     COMPREHENSIVE METABOLIC PANEL; Future    Type 2 diabetes mellitus with hyperlipidemia (CMS/HCC), controlled  -     COMPREHENSIVE METABOLIC PANEL; Future  -     GLYCOHEMOGLOBIN; Future  -     MICROALBUMIN URINE RANDOM; Future  -     THYROID STIMULATING HORMONE REFLEX; Future  -     URINALYSIS & REFLEX MICRO; Future    Viral URI, advised supportive treatment. Recommended plenty of fluid intake. Will observe  -     CBC & AUTO DIFFERENTIAL; Future    Prostate cancer screening  -     PROSTATE SPECIFIC ANTIGEN; Future    Other orders  -     cholecalciferol (VITAMIN D3) 1000 UNITS tablet; Take 1 tablet by mouth daily. -     fluticasone (FLONASE) 50 MCG/ACT nasal spray; Spray 2 sprays in each nostril daily.           12/6/2018  Denise Huynh MD - - -
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
## 2023-03-29 ENCOUNTER — APPOINTMENT (OUTPATIENT)
Dept: ORTHOPEDIC SURGERY | Facility: CLINIC | Age: 53
End: 2023-03-29
Payer: COMMERCIAL

## 2023-03-29 DIAGNOSIS — S93.402A SPRAIN OF UNSPECIFIED LIGAMENT OF LEFT ANKLE, INITIAL ENCOUNTER: ICD-10-CM

## 2023-03-29 DIAGNOSIS — S90.31XA CONTUSION OF RIGHT FOOT, INITIAL ENCOUNTER: ICD-10-CM

## 2023-03-29 DIAGNOSIS — S93.401A SPRAIN OF UNSPECIFIED LIGAMENT OF RIGHT ANKLE, INITIAL ENCOUNTER: ICD-10-CM

## 2023-03-29 PROCEDURE — 99214 OFFICE O/P EST MOD 30 MIN: CPT

## 2023-03-29 PROCEDURE — 73630 X-RAY EXAM OF FOOT: CPT | Mod: RT

## 2023-03-29 PROCEDURE — 73600 X-RAY EXAM OF ANKLE: CPT | Mod: RT
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
## 2023-03-29 NOTE — ASSESSMENT
[FreeTextEntry1] : Ice/nsaids prn for her foot.\par No signficant foot/ankle problems at this time.\par Recommend she follow up with Dr. Samayoa and/or Dr. Szymanski for spine issues.

## 2023-03-31 ENCOUNTER — APPOINTMENT (OUTPATIENT)
Dept: ORTHOPEDIC SURGERY | Facility: CLINIC | Age: 53
End: 2023-03-31
Payer: COMMERCIAL

## 2023-03-31 ENCOUNTER — APPOINTMENT (OUTPATIENT)
Dept: PAIN MANAGEMENT | Facility: CLINIC | Age: 53
End: 2023-03-31
Payer: COMMERCIAL

## 2023-03-31 VITALS — BODY MASS INDEX: 42.75 KG/M2 | HEIGHT: 66 IN | WEIGHT: 266 LBS

## 2023-03-31 VITALS — WEIGHT: 266 LBS | HEIGHT: 66 IN | BODY MASS INDEX: 42.75 KG/M2

## 2023-03-31 DIAGNOSIS — M54.2 CERVICALGIA: ICD-10-CM

## 2023-03-31 DIAGNOSIS — M54.17 RADICULOPATHY, LUMBOSACRAL REGION: ICD-10-CM

## 2023-03-31 DIAGNOSIS — M54.50 LOW BACK PAIN, UNSPECIFIED: ICD-10-CM

## 2023-03-31 PROCEDURE — 99244 OFF/OP CNSLTJ NEW/EST MOD 40: CPT

## 2023-03-31 PROCEDURE — 99214 OFFICE O/P EST MOD 30 MIN: CPT
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
## 2023-03-31 NOTE — PHYSICAL EXAM
[Bilateral] : knee bilaterally [NL (0)] : extension 0 degrees [4___] : hamstring 4[unfilled]/5 [Straightening consistent with spasm] : Straightening consistent with spasm [FreeTextEntry1] : no fx. some narrowing c45. [There are no fractures, subluxations or dislocations. No significant abnormalities are seen] : There are no fractures, subluxations or dislocations. No significant abnormalities are seen [TWNoteComboBox6] : internal rotation lateral hip [] : no erythema [Right] : right knee [Degenerative change] : Degenerative change [TWNoteComboBox7] : flexion 120 degrees

## 2023-03-31 NOTE — DISCUSSION/SUMMARY
[de-identified] : After discussing various treatment options with the patient including but not limited to oral medications, physical therapy, exercise, as well as interventional spinal injections, we have decided with the following plan:\par \par - Continue home exercises, stretching, activity modification, physical therapy, and conservative care.\par - Will order lumbar and cervical MRI to rule out HNP. Please let this note serve as a formal request for authorization to perform a MRI of their Lumbar and Cervical Spine.\par - Follow-up post diagnostic imaging to review and discuss further treatment.\par - Will provide prescription for Physical Therapy.\par
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
## 2023-03-31 NOTE — PHYSICAL EXAM
[de-identified] : Constitutional; Appears well, no apparent distress\par Ability to communicate: Normal \par Respiratory: non-labored breathing\par Skin: No rash noted\par Head: Normocephalic, atraumatic\par Neck: no visible thyroid enlargement\par Eyes: Extraocular movements intact\par Neurologic: Alert and oriented x3\par Psychiatric: normal mood, affect and behavior\par  [Rotation to left] : rotation to left [Rotation to right] : rotation to right [] : non-antalgic
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
## 2023-04-14 ENCOUNTER — APPOINTMENT (OUTPATIENT)
Dept: ORTHOPEDIC SURGERY | Facility: CLINIC | Age: 53
End: 2023-04-14
Payer: COMMERCIAL

## 2023-04-14 VITALS — WEIGHT: 266 LBS | BODY MASS INDEX: 42.75 KG/M2 | HEIGHT: 66 IN

## 2023-04-14 DIAGNOSIS — M54.12 RADICULOPATHY, CERVICAL REGION: ICD-10-CM

## 2023-04-14 PROCEDURE — 72110 X-RAY EXAM L-2 SPINE 4/>VWS: CPT

## 2023-04-14 PROCEDURE — 72050 X-RAY EXAM NECK SPINE 4/5VWS: CPT

## 2023-04-14 PROCEDURE — 72170 X-RAY EXAM OF PELVIS: CPT

## 2023-04-14 PROCEDURE — 99214 OFFICE O/P EST MOD 30 MIN: CPT

## 2023-04-14 NOTE — DISCUSSION/SUMMARY
[de-identified] : reviweed the extensive records from outside\par rec she get the scheduled MRis done \par cont with PT \par fu to review the MRis she has scheduled \par \par rec she cont to f/u with pain as well
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
## 2023-04-24 ENCOUNTER — APPOINTMENT (OUTPATIENT)
Dept: PAIN MANAGEMENT | Facility: CLINIC | Age: 53
End: 2023-04-24

## 2023-05-19 ENCOUNTER — APPOINTMENT (OUTPATIENT)
Dept: ORTHOPEDIC SURGERY | Facility: CLINIC | Age: 53
End: 2023-05-19

## 2023-05-22 ENCOUNTER — APPOINTMENT (OUTPATIENT)
Dept: ORTHOPEDIC SURGERY | Facility: CLINIC | Age: 53
End: 2023-05-22
Payer: COMMERCIAL

## 2023-05-22 VITALS — BODY MASS INDEX: 44.03 KG/M2 | WEIGHT: 274 LBS | HEIGHT: 66 IN

## 2023-05-22 PROCEDURE — 99215 OFFICE O/P EST HI 40 MIN: CPT
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
## 2023-05-22 NOTE — DISCUSSION/SUMMARY
[de-identified] : reviewed the case and the imaging with her \par discussion of tx optoins \par \par in the neck would be a good candidate acdf c 4-5\par \par I have d/w the patient for an Anterior Cervical Discectomy & Fusion.\par \par We've discussed the surgery details including instrumentation and grafting options (local, allograft, ICBG, and biologics) as well as potential for complications including but not limited to pain, scar, loss of function, permenant injury, death, need for additional surgery, need for blood transfuion, prolonged hospitilization, prolonged recovery, lack of recovery, blood clots, pulmonary embolism, heart attack, stroke, or  infection. There is also a possibility for hardware complication such as malposition of hardware,hardware loosening, pullout, failure or fracture of bone, adjacent segmen tdisease, pseudarthrosis, and need for future surgery. Finally, we discussed potential for injury to nerves, spinal cord or blood vessels, paralysis, blindness, need for transfusion, general anesthesia, allergic reaction, prolonged intubation,myocardial infarction, stroke, deep venous thrombosis, pulmonary embolus, and death. The patient understands these things and all questions are answered tohis/her satisfaction.  The surgery may need to be paused or staged or not completed due to intraoperative events or at the surgeon's discretion.  Any injury that occurs may be permenate.  \par \par Spinal fluid leak may occur and may require prolonged time in the hospital or further surgical procedures \par \par Patient has been instructed to stop all Aspirin and NSAIDs 10 days prior to surgery date. For Coumadin and other blood thinners, the patient is referred to the medical doctor\par \par We discussed we will use neuromonitioring for the surgery in order to possibly migitate risks of injury. \par \par The procedure does not come with a guarantee of success or of satisfaction on the patient's behalf.\par \par At the surgeon's discretion he may call for assistance during the surgery or in the perioperative period\par \par in the lower back don’t see a great surgical target  \par \par

## 2023-05-22 NOTE — REASON FOR VISIT
[Family Member] : family member [FreeTextEntry2] : MRI review Neck, lower back   NF . DOA; 3/21/2023

## 2023-05-31 ENCOUNTER — APPOINTMENT (OUTPATIENT)
Dept: UROLOGY | Facility: CLINIC | Age: 53
End: 2023-05-31
Payer: MEDICARE

## 2023-05-31 DIAGNOSIS — C64.9 MALIGNANT NEOPLASM OF UNSPECIFIED KIDNEY, EXCEPT RENAL PELVIS: ICD-10-CM

## 2023-05-31 DIAGNOSIS — R35.0 FREQUENCY OF MICTURITION: ICD-10-CM

## 2023-05-31 PROCEDURE — 99214 OFFICE O/P EST MOD 30 MIN: CPT

## 2023-06-01 LAB
APPEARANCE: CLEAR
BACTERIA: ABNORMAL /HPF
BILIRUBIN URINE: NEGATIVE
BLOOD URINE: NEGATIVE
CAST: 0 /LPF
COLOR: YELLOW
EPITHELIAL CELLS: 10 /HPF
GLUCOSE QUALITATIVE U: NEGATIVE MG/DL
KETONES URINE: NEGATIVE MG/DL
LEUKOCYTE ESTERASE URINE: ABNORMAL
MICROSCOPIC-UA: NORMAL
NITRITE URINE: NEGATIVE
PH URINE: 6.5
PROTEIN URINE: NEGATIVE MG/DL
RED BLOOD CELLS URINE: 1 /HPF
SPECIFIC GRAVITY URINE: 1.01
UROBILINOGEN URINE: 0.2 MG/DL
WHITE BLOOD CELLS URINE: 1 /HPF

## 2023-06-02 LAB — BACTERIA UR CULT: NORMAL

## 2023-07-03 ENCOUNTER — APPOINTMENT (OUTPATIENT)
Dept: ORTHOPEDIC SURGERY | Facility: CLINIC | Age: 53
End: 2023-07-03
Payer: MEDICARE

## 2023-07-03 DIAGNOSIS — M47.812 SPONDYLOSIS W/OUT MYELOPATHY OR RADICULOPATHY, CERVICAL REGION: ICD-10-CM

## 2023-07-03 DIAGNOSIS — M51.26 OTHER INTERVERTEBRAL DISC DISPLACEMENT, LUMBAR REGION: ICD-10-CM

## 2023-07-03 DIAGNOSIS — M54.16 RADICULOPATHY, LUMBAR REGION: ICD-10-CM

## 2023-07-03 DIAGNOSIS — M54.2 CERVICALGIA: ICD-10-CM

## 2023-07-03 DIAGNOSIS — M47.816 SPONDYLOSIS W/OUT MYELOPATHY OR RADICULOPATHY, LUMBAR REGION: ICD-10-CM

## 2023-07-03 PROCEDURE — 99214 OFFICE O/P EST MOD 30 MIN: CPT
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
## 2023-07-03 NOTE — DISCUSSION/SUMMARY
[de-identified] : reviewed the case and the imaging with her \par reviweed the MRi disc today \par discussion of tx optoins \par \par in the neck would be a good candidate acdf c 4-5\par \par I have d/w the patient for an Anterior Cervical Discectomy & Fusion.\par \par We've discussed the surgery details including instrumentation and grafting options (local, allograft, ICBG, and biologics) as well as potential for complications including but not limited to pain, scar, loss of function, permenant injury, death, need for additional surgery, need for blood transfuion, prolonged hospitilization, prolonged recovery, lack of recovery, blood clots, pulmonary embolism, heart attack, stroke, or  infection. There is also a possibility for hardware complication such as malposition of hardware,hardware loosening, pullout, failure or fracture of bone, adjacent segmen tdisease, pseudarthrosis, and need for future surgery. Finally, we discussed potential for injury to nerves, spinal cord or blood vessels, paralysis, blindness, need for transfusion, general anesthesia, allergic reaction, prolonged intubation,myocardial infarction, stroke, deep venous thrombosis, pulmonary embolus, and death. The patient understands these things and all questions are answered tohis/her satisfaction.  The surgery may need to be paused or staged or not completed due to intraoperative events or at the surgeon's discretion.  Any injury that occurs may be permenate.  \par \par Spinal fluid leak may occur and may require prolonged time in the hospital or further surgical procedures \par \par Patient has been instructed to stop all Aspirin and NSAIDs 10 days prior to surgery date. For Coumadin and other blood thinners, the patient is referred to the medical doctor\par \par We discussed we will use neuromonitioring for the surgery in order to possibly migitate risks of injury. \par \par The procedure does not come with a guarantee of success or of satisfaction on the patient's behalf.\par \par At the surgeon's discretion he may call for assistance during the surgery or in the perioperative period\par \par in the lower back don’t see a great surgical target  \par at this point cont with conservaitve tx \par

## 2023-07-03 NOTE — DATA REVIEWED
[MRI] : MRI [Lumbar Spine] : lumbar spine [Report was reviewed and noted in the chart] : The report was reviewed and noted in the chart [I independently reviewed and interpreted images and report] : I independently reviewed and interpreted images and report [Cervical Spine] : cervical spine

## 2023-08-21 NOTE — ED ADULT TRIAGE NOTE - BP NONINVASIVE SYSTOLIC (MM HG)
161 Cheiloplasty (Complex) Text: A decision was made to reconstruct the defect with a  cheiloplasty.  The defect was undermined extensively.  Additional obicularis oris muscle was excised with a 15 blade scalpel.  The defect was converted into a full thickness wedge to facilite a better cosmetic result.  Small vessels were then tied off with 5-0 monocyrl. The obicularis oris, superficial fascia, adipose and dermis were then reapproximated.  After the deeper layers were approximated the epidermis was reapproximated with particular care given to realign the vermilion border.

## 2023-08-25 ENCOUNTER — APPOINTMENT (OUTPATIENT)
Dept: ORTHOPEDIC SURGERY | Facility: CLINIC | Age: 53
End: 2023-08-25
Payer: COMMERCIAL

## 2023-08-25 VITALS — BODY MASS INDEX: 44.03 KG/M2 | HEIGHT: 66 IN | WEIGHT: 274 LBS

## 2023-08-25 DIAGNOSIS — S46.911D STRAIN OF UNSPECIFIED MUSCLE, FASCIA AND TENDON AT SHOULDER AND UPPER ARM LEVEL, RIGHT ARM, SUBSEQUENT ENCOUNTER: ICD-10-CM

## 2023-08-25 PROCEDURE — 99214 OFFICE O/P EST MOD 30 MIN: CPT

## 2023-08-25 NOTE — DISCUSSION/SUMMARY
[de-identified] : Pt bilateral knees and right shoulder. follow up 4 - 6 weeks request visco bilateral knees.

## 2023-08-25 NOTE — PHYSICAL EXAM
[Straightening consistent with spasm] : Straightening consistent with spasm [There are no fractures, subluxations or dislocations. No significant abnormalities are seen] : There are no fractures, subluxations or dislocations. No significant abnormalities are seen [Bilateral] : knee bilaterally [NL (0)] : extension 0 degrees [4___] : hamstring 4[unfilled]/5 [Right] : right knee [Degenerative change] : Degenerative change [FreeTextEntry1] : no fx. some narrowing c45. [TWNoteComboBox6] : internal rotation lateral hip [] : no erythema [TWNoteComboBox7] : flexion 120 degrees

## 2023-08-25 NOTE — DATA REVIEWED
[Knee] : knee [MRI] : MRI [Right] : of the right [Shoulder] : shoulder [Report was reviewed and noted in the chart] : The report was reviewed and noted in the chart
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
## 2023-10-06 ENCOUNTER — APPOINTMENT (OUTPATIENT)
Dept: PAIN MANAGEMENT | Facility: CLINIC | Age: 53
End: 2023-10-06

## 2023-10-17 ENCOUNTER — APPOINTMENT (OUTPATIENT)
Dept: ORTHOPEDIC SURGERY | Facility: CLINIC | Age: 53
End: 2023-10-17
Payer: MEDICARE

## 2023-10-17 PROCEDURE — 20610 DRAIN/INJ JOINT/BURSA W/O US: CPT | Mod: 50

## 2023-10-17 PROCEDURE — 99213 OFFICE O/P EST LOW 20 MIN: CPT | Mod: 25

## 2023-10-24 ENCOUNTER — APPOINTMENT (OUTPATIENT)
Dept: ORTHOPEDIC SURGERY | Facility: CLINIC | Age: 53
End: 2023-10-24
Payer: MEDICARE

## 2023-10-24 PROCEDURE — 20610 DRAIN/INJ JOINT/BURSA W/O US: CPT | Mod: 50

## 2023-10-24 PROCEDURE — 99212 OFFICE O/P EST SF 10 MIN: CPT | Mod: 25

## 2023-10-31 ENCOUNTER — APPOINTMENT (OUTPATIENT)
Dept: ORTHOPEDIC SURGERY | Facility: CLINIC | Age: 53
End: 2023-10-31
Payer: MEDICARE

## 2023-10-31 VITALS — HEIGHT: 66 IN | WEIGHT: 274 LBS | BODY MASS INDEX: 44.03 KG/M2

## 2023-10-31 DIAGNOSIS — Z00.00 ENCOUNTER FOR GENERAL ADULT MEDICAL EXAMINATION W/OUT ABNORMAL FINDINGS: ICD-10-CM

## 2023-10-31 PROCEDURE — 99212 OFFICE O/P EST SF 10 MIN: CPT | Mod: 25

## 2023-10-31 PROCEDURE — 20611 DRAIN/INJ JOINT/BURSA W/US: CPT | Mod: 50

## 2023-11-16 ENCOUNTER — APPOINTMENT (OUTPATIENT)
Dept: ORTHOPEDIC SURGERY | Facility: CLINIC | Age: 53
End: 2023-11-16
Payer: MEDICARE

## 2023-11-16 VITALS — BODY MASS INDEX: 44.03 KG/M2 | HEIGHT: 66 IN | WEIGHT: 274 LBS

## 2023-11-16 DIAGNOSIS — M17.11 UNILATERAL PRIMARY OSTEOARTHRITIS, RIGHT KNEE: ICD-10-CM

## 2023-11-16 DIAGNOSIS — M17.12 UNILATERAL PRIMARY OSTEOARTHRITIS, LEFT KNEE: ICD-10-CM

## 2023-11-16 PROCEDURE — 20610 DRAIN/INJ JOINT/BURSA W/O US: CPT | Mod: 50

## 2023-11-16 PROCEDURE — 99212 OFFICE O/P EST SF 10 MIN: CPT | Mod: 25

## 2023-11-27 ENCOUNTER — APPOINTMENT (OUTPATIENT)
Dept: ORTHOPEDIC SURGERY | Facility: CLINIC | Age: 53
End: 2023-11-27
Payer: MEDICARE

## 2023-11-27 DIAGNOSIS — M79.671 PAIN IN RIGHT FOOT: ICD-10-CM

## 2023-11-27 DIAGNOSIS — K21.9 GASTRO-ESOPHAGEAL REFLUX DISEASE W/OUT ESOPHAGITIS: ICD-10-CM

## 2023-11-27 DIAGNOSIS — M76.71 PERONEAL TENDINITIS, RIGHT LEG: ICD-10-CM

## 2023-11-27 PROCEDURE — 73630 X-RAY EXAM OF FOOT: CPT | Mod: RT

## 2023-11-27 PROCEDURE — 99214 OFFICE O/P EST MOD 30 MIN: CPT

## 2023-11-27 PROCEDURE — 73610 X-RAY EXAM OF ANKLE: CPT | Mod: RT

## 2023-11-27 PROCEDURE — L4361: CPT | Mod: KX,RT

## 2023-11-27 RX ORDER — AMLODIPINE BESYLATE 5 MG/1
TABLET ORAL
Refills: 0 | Status: ACTIVE | COMMUNITY

## 2023-11-27 RX ORDER — NEBIVOLOL HYDROCHLORIDE 10 MG/1
10 TABLET ORAL
Refills: 0 | Status: ACTIVE | COMMUNITY

## 2023-11-28 ENCOUNTER — APPOINTMENT (OUTPATIENT)
Dept: MRI IMAGING | Facility: CLINIC | Age: 53
End: 2023-11-28
Payer: MEDICARE

## 2023-11-28 PROCEDURE — 73721 MRI JNT OF LWR EXTRE W/O DYE: CPT | Mod: RT,MH

## 2023-12-06 ENCOUNTER — APPOINTMENT (OUTPATIENT)
Dept: ORTHOPEDIC SURGERY | Facility: CLINIC | Age: 53
End: 2023-12-06
Payer: MEDICARE

## 2023-12-06 DIAGNOSIS — S86.011D STRAIN OF RIGHT ACHILLES TENDON, SUBSEQUENT ENCOUNTER: ICD-10-CM

## 2023-12-06 DIAGNOSIS — M25.671 STIFFNESS OF RIGHT ANKLE, NOT ELSEWHERE CLASSIFIED: ICD-10-CM

## 2023-12-06 PROCEDURE — 99214 OFFICE O/P EST MOD 30 MIN: CPT

## 2024-01-17 ENCOUNTER — APPOINTMENT (OUTPATIENT)
Dept: ORTHOPEDIC SURGERY | Facility: CLINIC | Age: 54
End: 2024-01-17

## 2024-04-26 ENCOUNTER — EMERGENCY (EMERGENCY)
Facility: HOSPITAL | Age: 54
LOS: 1 days | Discharge: HOME | End: 2024-04-26
Attending: STUDENT IN AN ORGANIZED HEALTH CARE EDUCATION/TRAINING PROGRAM
Payer: MEDICARE

## 2024-04-26 VITALS
TEMPERATURE: 99 F | DIASTOLIC BLOOD PRESSURE: 97 MMHG | HEART RATE: 63 BPM | RESPIRATION RATE: 18 BRPM | SYSTOLIC BLOOD PRESSURE: 168 MMHG | OXYGEN SATURATION: 98 %

## 2024-04-26 VITALS
HEART RATE: 87 BPM | DIASTOLIC BLOOD PRESSURE: 99 MMHG | OXYGEN SATURATION: 98 % | SYSTOLIC BLOOD PRESSURE: 181 MMHG | TEMPERATURE: 98 F | RESPIRATION RATE: 18 BRPM

## 2024-04-26 DIAGNOSIS — Z98.890 OTHER SPECIFIED POSTPROCEDURAL STATES: Chronic | ICD-10-CM

## 2024-04-26 PROCEDURE — 72128 CT CHEST SPINE W/O DYE: CPT | Mod: MC

## 2024-04-26 PROCEDURE — 72125 CT NECK SPINE W/O DYE: CPT | Mod: MC

## 2024-04-26 PROCEDURE — 72131 CT LUMBAR SPINE W/O DYE: CPT | Mod: MC

## 2024-04-26 PROCEDURE — 72128 CT CHEST SPINE W/O DYE: CPT | Mod: 26,MC

## 2024-04-26 PROCEDURE — 70450 CT HEAD/BRAIN W/O DYE: CPT | Mod: MC

## 2024-04-26 PROCEDURE — 72125 CT NECK SPINE W/O DYE: CPT | Mod: 26,MC

## 2024-04-26 PROCEDURE — 70450 CT HEAD/BRAIN W/O DYE: CPT | Mod: 26,MC

## 2024-04-26 PROCEDURE — 99284 EMERGENCY DEPT VISIT MOD MDM: CPT | Mod: FS

## 2024-04-26 PROCEDURE — 73030 X-RAY EXAM OF SHOULDER: CPT

## 2024-04-26 PROCEDURE — 72131 CT LUMBAR SPINE W/O DYE: CPT | Mod: 26,MC

## 2024-04-26 PROCEDURE — 99284 EMERGENCY DEPT VISIT MOD MDM: CPT | Mod: 25

## 2024-04-26 PROCEDURE — 73030 X-RAY EXAM OF SHOULDER: CPT | Mod: 26,RT

## 2024-04-26 RX ORDER — DIAZEPAM 5 MG
5 TABLET ORAL ONCE
Refills: 0 | Status: DISCONTINUED | OUTPATIENT
Start: 2024-04-26 | End: 2024-04-26

## 2024-04-26 RX ORDER — ACETAMINOPHEN 500 MG
975 TABLET ORAL ONCE
Refills: 0 | Status: COMPLETED | OUTPATIENT
Start: 2024-04-26 | End: 2024-04-26

## 2024-04-26 RX ORDER — CYCLOBENZAPRINE HYDROCHLORIDE 10 MG/1
1 TABLET, FILM COATED ORAL
Qty: 15 | Refills: 0
Start: 2024-04-26 | End: 2024-04-30

## 2024-04-26 RX ORDER — LIDOCAINE 4 G/100G
1 CREAM TOPICAL ONCE
Refills: 0 | Status: DISCONTINUED | OUTPATIENT
Start: 2024-04-26 | End: 2024-04-30

## 2024-04-26 RX ADMIN — Medication 975 MILLIGRAM(S): at 14:01

## 2024-04-26 RX ADMIN — Medication 5 MILLIGRAM(S): at 14:01

## 2024-04-26 RX ADMIN — Medication 975 MILLIGRAM(S): at 01:43

## 2024-04-26 NOTE — ED PROVIDER NOTE - PATIENT PORTAL LINK FT
You can access the FollowMyHealth Patient Portal offered by [facility] by registering at the following website: [email or web address]. By joining [facility]’s FollowMyHealth portal, you will also be able to view your health information using other applications (apps) compatible with our system.
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
## 2024-04-26 NOTE — ED ADULT NURSE NOTE - NSFALLHARMRISKINTERV_ED_ALL_ED
Assistance OOB with selected safe patient handling equipment if applicable/Communicate risk of Fall with Harm to all staff, patient, and family/Monitor gait and stability/Provide patient with walking aids/Provide visual cue: red socks, yellow wristband, yellow gown, etc/Reinforce activity limits and safety measures with patient and family/Bed in lowest position, wheels locked, appropriate side rails in place/Call bell, personal items and telephone in reach/Instruct patient to call for assistance before getting out of bed/chair/stretcher/Non-slip footwear applied when patient is off stretcher/Equality to call system/Physically safe environment - no spills, clutter or unnecessary equipment/Purposeful Proactive Rounding/Room/bathroom lighting operational, light cord in reach
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
## 2024-04-26 NOTE — ED ADULT NURSE REASSESSMENT NOTE - NS ED NURSE REASSESS COMMENT FT1
Pt verbalizes understanding to f/u with PCP and return to ED for any worsening symptoms. Pt wants to wear C-collar , pt was asked to be removed the c/collar , pt refused and keep on "make me feel better"

## 2024-04-26 NOTE — ED PROVIDER NOTE - PHYSICAL EXAMINATION
CONSTITUTIONAL: Patient is awake, alert and oriented x 3. Patient is tearful,   HEAD: NCAT  EYES: PERRL bilaterally,   ENT: Airway patent, Nasal mucosa clear.   NECK: Supple,   LUNGS: CTA B/L,   HEART: RRR.+S1S2   ABDOMEN: Soft, non-tender to palpation throughout all four quadrants,  MSK: FROM upper and lower ext b/l, (+) ttp to midline cervical and lumbar spine, (+) C collar in place, (+) unable to perform flexion/extension to right shoulder 2/2 to pain;   SKIN: No rash or lesions,   NEURO: No focal deficits,

## 2024-04-26 NOTE — ED PROVIDER NOTE - NSFOLLOWUPINSTRUCTIONS_ED_ALL_ED_FT
1. Follow up with your PCP within 2-3 days.   2. Rest. Hydrate.   3. Take tylenol as directed for pain. You may take flexeril 1 tablet eveyr 8 hours as needed for muscle spasm. It may make you drowsy. Do Not drive while taking.   4. Return to the emergency department if you have worsening pain, numbness, tingling,  headache, dizziness, fainting, vomiting or all other concerning symptoms.

## 2024-04-26 NOTE — ED ADULT NURSE NOTE - MODE OF DISCHARGE
Lipid panel shows elevated total cholesterol, LDL elevated at 137, normal HDL and triglycerides.  CMP, TSH, CBC is normal.  Advise low-cholesterol diet, regular exercise.  Continue simvastatin 20 mg daily
Ambulatory
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
## 2024-04-26 NOTE — ED PROVIDER NOTE - PROGRESS NOTE DETAILS
Pain has improved s/p meds. Patient able to ambulate in ED. Imaging no acute findings. Discussed all results with pt. Recommended supportive care. Will send rx for flexeril home. Maryanne Baer PA-C Pain has improved s/p meds. Patient able to ambulate in ED. no midline spinal tenderness, pt aware of results, has known hx of herniated discs findings likely chronic,     Imaging no acute findings. Discussed all results with pt. Recommended supportive care. Will send rx for flexeril home. Maryanne Baer PA-C

## 2024-05-23 ENCOUNTER — APPOINTMENT (OUTPATIENT)
Dept: UROLOGY | Facility: CLINIC | Age: 54
End: 2024-05-23
Payer: MEDICARE

## 2024-05-23 DIAGNOSIS — D30.00 BENIGN NEOPLASM OF UNSPECIFIED KIDNEY: ICD-10-CM

## 2024-05-23 PROCEDURE — 99214 OFFICE O/P EST MOD 30 MIN: CPT

## 2024-05-23 PROCEDURE — G2211 COMPLEX E/M VISIT ADD ON: CPT

## 2024-05-24 LAB
APPEARANCE: CLEAR
BACTERIA: NEGATIVE /HPF
BILIRUBIN URINE: NEGATIVE
BLOOD URINE: NEGATIVE
CAST: 1 /LPF
COLOR: YELLOW
EPITHELIAL CELLS: 3 /HPF
GLUCOSE QUALITATIVE U: NEGATIVE MG/DL
KETONES URINE: NEGATIVE MG/DL
LEUKOCYTE ESTERASE URINE: ABNORMAL
MICROSCOPIC-UA: NORMAL
NITRITE URINE: NEGATIVE
PH URINE: 5.5
PROTEIN URINE: NEGATIVE MG/DL
RED BLOOD CELLS URINE: 1 /HPF
SPECIFIC GRAVITY URINE: 1.02
URINE CYTOLOGY: NORMAL
UROBILINOGEN URINE: 0.2 MG/DL
WHITE BLOOD CELLS URINE: 2 /HPF

## 2024-05-25 LAB — BACTERIA UR CULT: NORMAL

## 2024-06-18 NOTE — PHYSICAL EXAM
Group Therapy Note    Date: 6/18/2024    Group Start Time: 1100  Group End Time: 1150  Group Topic: Cognitive Skills    [facility]        Group Therapy Note    Attendees: 8/20     Topic: To increase socialization and practice self expression, explore coping skills and   strengths, and communication skills.      Patient did not participate in Cognitive Skills Group at 1100, despite staff invitation and explanation of benefits.   Pt was seclusive to self and room during group        Q15 minute safety checks maintained for patient safety and will continue to encourage   patient to attend unit programming.       Discipline Responsible: Psychoeducational Specialist  Signature:  MELIDA GASTON               [5th] : 5th [3rd] : 3rd [NL (40)] : plantar flexion 40 degrees [NL 30)] : inversion 30 degrees [NL (20)] : eversion 20 degrees [4___] : eversion 4[unfilled]/5 [5___] : [facility] 5[unfilled]/5 [2+] : posterior tibialis pulse: 2+ [Normal] : saphenous nerve sensation normal [] : no pain when stressing lateral tarsal metatarsal joint [Left] : left foot [Right] : right foot [Weight -] : weightbearing [There are no fractures, subluxations or dislocations. No significant abnormalities are seen] : There are no fractures, subluxations or dislocations. No significant abnormalities are seen [de-identified] : Calcification at base of 5th metatarsal.  Bone island in 5th metatarsal. [de-identified] : inversion 15 degrees [de-identified] : eversion 10 degrees [TWNoteComboBox7] : dorsiflexion 15 degrees

## 2024-06-26 NOTE — ED PROVIDER NOTE - EXITCARE/DISCHARGE INSTRUCTIONS
Prn Atarax 50 mg given prior noted to be effective evidenced by patient noted to be asleep comfortably at this time. No signs of distress/discomfort noted.    Launch [employer] and print the 'Prescriptions from this Visit' Report

## 2024-08-20 ENCOUNTER — INPATIENT (INPATIENT)
Facility: HOSPITAL | Age: 54
LOS: 4 days | Discharge: HOME | DRG: 639 | End: 2024-08-25
Attending: HOSPITALIST | Admitting: STUDENT IN AN ORGANIZED HEALTH CARE EDUCATION/TRAINING PROGRAM
Payer: MEDICARE

## 2024-08-20 VITALS
TEMPERATURE: 99 F | WEIGHT: 272.93 LBS | HEART RATE: 81 BPM | SYSTOLIC BLOOD PRESSURE: 168 MMHG | RESPIRATION RATE: 18 BRPM | OXYGEN SATURATION: 98 % | DIASTOLIC BLOOD PRESSURE: 104 MMHG | HEIGHT: 66 IN

## 2024-08-20 DIAGNOSIS — Z98.890 OTHER SPECIFIED POSTPROCEDURAL STATES: Chronic | ICD-10-CM

## 2024-08-20 DIAGNOSIS — E11.9 TYPE 2 DIABETES MELLITUS WITHOUT COMPLICATIONS: ICD-10-CM

## 2024-08-20 DIAGNOSIS — E11.00 TYPE 2 DIABETES MELLITUS WITH HYPEROSMOLARITY WITHOUT NONKETOTIC HYPERGLYCEMIC-HYPEROSMOLAR COMA (NKHHC): ICD-10-CM

## 2024-08-20 DIAGNOSIS — I10 ESSENTIAL (PRIMARY) HYPERTENSION: ICD-10-CM

## 2024-08-20 DIAGNOSIS — F32.9 MAJOR DEPRESSIVE DISORDER, SINGLE EPISODE, UNSPECIFIED: ICD-10-CM

## 2024-08-20 DIAGNOSIS — M54.2 CERVICALGIA: ICD-10-CM

## 2024-08-20 DIAGNOSIS — Z29.9 ENCOUNTER FOR PROPHYLACTIC MEASURES, UNSPECIFIED: ICD-10-CM

## 2024-08-20 LAB
A1C WITH ESTIMATED AVERAGE GLUCOSE RESULT: 12.6 % — HIGH (ref 4–5.6)
ALBUMIN SERPL ELPH-MCNC: 4.2 G/DL — SIGNIFICANT CHANGE UP (ref 3.3–5)
ALP SERPL-CCNC: 234 U/L — HIGH (ref 40–120)
ALT FLD-CCNC: 18 U/L — SIGNIFICANT CHANGE UP (ref 10–45)
ANION GAP SERPL CALC-SCNC: 11 MMOL/L — SIGNIFICANT CHANGE UP (ref 5–17)
ANION GAP SERPL CALC-SCNC: 14 MMOL/L — SIGNIFICANT CHANGE UP (ref 5–17)
APPEARANCE UR: CLEAR — SIGNIFICANT CHANGE UP
AST SERPL-CCNC: 12 U/L — SIGNIFICANT CHANGE UP (ref 10–40)
B-OH-BUTYR SERPL-SCNC: 0.9 MMOL/L — HIGH
BACTERIA # UR AUTO: NEGATIVE /HPF — SIGNIFICANT CHANGE UP
BASOPHILS # BLD AUTO: 0.04 K/UL — SIGNIFICANT CHANGE UP (ref 0–0.2)
BASOPHILS NFR BLD AUTO: 0.5 % — SIGNIFICANT CHANGE UP (ref 0–2)
BILIRUB SERPL-MCNC: 0.3 MG/DL — SIGNIFICANT CHANGE UP (ref 0.2–1.2)
BILIRUB UR-MCNC: NEGATIVE — SIGNIFICANT CHANGE UP
BUN SERPL-MCNC: 14 MG/DL — SIGNIFICANT CHANGE UP (ref 7–23)
BUN SERPL-MCNC: 20 MG/DL — SIGNIFICANT CHANGE UP (ref 7–23)
CALCIUM SERPL-MCNC: 10 MG/DL — SIGNIFICANT CHANGE UP (ref 8.4–10.5)
CALCIUM SERPL-MCNC: 9.1 MG/DL — SIGNIFICANT CHANGE UP (ref 8.4–10.5)
CAST: 0 /LPF — SIGNIFICANT CHANGE UP (ref 0–4)
CHLORIDE SERPL-SCNC: 106 MMOL/L — SIGNIFICANT CHANGE UP (ref 96–108)
CHLORIDE SERPL-SCNC: 94 MMOL/L — LOW (ref 96–108)
CO2 SERPL-SCNC: 20 MMOL/L — LOW (ref 22–31)
CO2 SERPL-SCNC: 22 MMOL/L — SIGNIFICANT CHANGE UP (ref 22–31)
COLOR SPEC: YELLOW — SIGNIFICANT CHANGE UP
CREAT SERPL-MCNC: 0.65 MG/DL — SIGNIFICANT CHANGE UP (ref 0.5–1.3)
CREAT SERPL-MCNC: 0.91 MG/DL — SIGNIFICANT CHANGE UP (ref 0.5–1.3)
DIFF PNL FLD: NEGATIVE — SIGNIFICANT CHANGE UP
EGFR: 106 ML/MIN/1.73M2 — SIGNIFICANT CHANGE UP
EGFR: 76 ML/MIN/1.73M2 — SIGNIFICANT CHANGE UP
EOSINOPHIL # BLD AUTO: 0.08 K/UL — SIGNIFICANT CHANGE UP (ref 0–0.5)
EOSINOPHIL NFR BLD AUTO: 1 % — SIGNIFICANT CHANGE UP (ref 0–6)
ESTIMATED AVERAGE GLUCOSE: 315 MG/DL — HIGH (ref 68–114)
GAS PNL BLDV: SIGNIFICANT CHANGE UP
GAS PNL BLDV: SIGNIFICANT CHANGE UP
GLUCOSE BLDC GLUCOMTR-MCNC: 231 MG/DL — HIGH (ref 70–99)
GLUCOSE BLDC GLUCOMTR-MCNC: 234 MG/DL — HIGH (ref 70–99)
GLUCOSE BLDC GLUCOMTR-MCNC: 295 MG/DL — HIGH (ref 70–99)
GLUCOSE SERPL-MCNC: 280 MG/DL — HIGH (ref 70–99)
GLUCOSE SERPL-MCNC: >788 MG/DL — CRITICAL HIGH (ref 70–99)
GLUCOSE UR QL: >=1000 MG/DL
HCG SERPL-ACNC: <2 MIU/ML — SIGNIFICANT CHANGE UP
HCT VFR BLD CALC: 40.4 % — SIGNIFICANT CHANGE UP (ref 34.5–45)
HGB BLD-MCNC: 13.1 G/DL — SIGNIFICANT CHANGE UP (ref 11.5–15.5)
IMM GRANULOCYTES NFR BLD AUTO: 0.8 % — SIGNIFICANT CHANGE UP (ref 0–0.9)
KETONES UR-MCNC: 15 MG/DL
LEUKOCYTE ESTERASE UR-ACNC: ABNORMAL
LYMPHOCYTES # BLD AUTO: 1.99 K/UL — SIGNIFICANT CHANGE UP (ref 1–3.3)
LYMPHOCYTES # BLD AUTO: 25.3 % — SIGNIFICANT CHANGE UP (ref 13–44)
MAGNESIUM SERPL-MCNC: 1.8 MG/DL — SIGNIFICANT CHANGE UP (ref 1.6–2.6)
MAGNESIUM SERPL-MCNC: 1.9 MG/DL — SIGNIFICANT CHANGE UP (ref 1.6–2.6)
MCHC RBC-ENTMCNC: 29 PG — SIGNIFICANT CHANGE UP (ref 27–34)
MCHC RBC-ENTMCNC: 32.4 GM/DL — SIGNIFICANT CHANGE UP (ref 32–36)
MCV RBC AUTO: 89.4 FL — SIGNIFICANT CHANGE UP (ref 80–100)
MONOCYTES # BLD AUTO: 0.54 K/UL — SIGNIFICANT CHANGE UP (ref 0–0.9)
MONOCYTES NFR BLD AUTO: 6.9 % — SIGNIFICANT CHANGE UP (ref 2–14)
NEUTROPHILS # BLD AUTO: 5.17 K/UL — SIGNIFICANT CHANGE UP (ref 1.8–7.4)
NEUTROPHILS NFR BLD AUTO: 65.5 % — SIGNIFICANT CHANGE UP (ref 43–77)
NITRITE UR-MCNC: NEGATIVE — SIGNIFICANT CHANGE UP
NRBC # BLD: 0 /100 WBCS — SIGNIFICANT CHANGE UP (ref 0–0)
PH UR: 6 — SIGNIFICANT CHANGE UP (ref 5–8)
PHOSPHATE SERPL-MCNC: 2.6 MG/DL — SIGNIFICANT CHANGE UP (ref 2.5–4.5)
PLATELET # BLD AUTO: 188 K/UL — SIGNIFICANT CHANGE UP (ref 150–400)
POTASSIUM SERPL-MCNC: 3.8 MMOL/L — SIGNIFICANT CHANGE UP (ref 3.5–5.3)
POTASSIUM SERPL-MCNC: 5 MMOL/L — SIGNIFICANT CHANGE UP (ref 3.5–5.3)
POTASSIUM SERPL-SCNC: 3.8 MMOL/L — SIGNIFICANT CHANGE UP (ref 3.5–5.3)
POTASSIUM SERPL-SCNC: 5 MMOL/L — SIGNIFICANT CHANGE UP (ref 3.5–5.3)
PROT SERPL-MCNC: 7.5 G/DL — SIGNIFICANT CHANGE UP (ref 6–8.3)
PROT UR-MCNC: NEGATIVE MG/DL — SIGNIFICANT CHANGE UP
RBC # BLD: 4.52 M/UL — SIGNIFICANT CHANGE UP (ref 3.8–5.2)
RBC # FLD: 13.1 % — SIGNIFICANT CHANGE UP (ref 10.3–14.5)
RBC CASTS # UR COMP ASSIST: 1 /HPF — SIGNIFICANT CHANGE UP (ref 0–4)
SODIUM SERPL-SCNC: 128 MMOL/L — LOW (ref 135–145)
SODIUM SERPL-SCNC: 139 MMOL/L — SIGNIFICANT CHANGE UP (ref 135–145)
SP GR SPEC: >1.03 — HIGH (ref 1–1.03)
SQUAMOUS # UR AUTO: 1 /HPF — SIGNIFICANT CHANGE UP (ref 0–5)
UROBILINOGEN FLD QL: 0.2 MG/DL — SIGNIFICANT CHANGE UP (ref 0.2–1)
WBC # BLD: 7.88 K/UL — SIGNIFICANT CHANGE UP (ref 3.8–10.5)
WBC # FLD AUTO: 7.88 K/UL — SIGNIFICANT CHANGE UP (ref 3.8–10.5)
WBC UR QL: 18 /HPF — HIGH (ref 0–5)

## 2024-08-20 PROCEDURE — 99223 1ST HOSP IP/OBS HIGH 75: CPT

## 2024-08-20 PROCEDURE — 99223 1ST HOSP IP/OBS HIGH 75: CPT | Mod: FS

## 2024-08-20 PROCEDURE — 99285 EMERGENCY DEPT VISIT HI MDM: CPT

## 2024-08-20 RX ORDER — DEXTROSE 15 G/33 G
25 GEL IN PACKET (GRAM) ORAL ONCE
Refills: 0 | Status: DISCONTINUED | OUTPATIENT
Start: 2024-08-20 | End: 2024-08-25

## 2024-08-20 RX ORDER — SODIUM CHLORIDE 9 MG/ML
1000 INJECTION INTRAMUSCULAR; INTRAVENOUS; SUBCUTANEOUS ONCE
Refills: 0 | Status: COMPLETED | OUTPATIENT
Start: 2024-08-20 | End: 2024-08-20

## 2024-08-20 RX ORDER — INSULIN GLARGINE 100 [IU]/ML
25 INJECTION, SOLUTION SUBCUTANEOUS AT BEDTIME
Refills: 0 | Status: DISCONTINUED | OUTPATIENT
Start: 2024-08-20 | End: 2024-08-20

## 2024-08-20 RX ORDER — INSULIN GLARGINE 100 [IU]/ML
30 INJECTION, SOLUTION SUBCUTANEOUS AT BEDTIME
Refills: 0 | Status: DISCONTINUED | OUTPATIENT
Start: 2024-08-21 | End: 2024-08-21

## 2024-08-20 RX ORDER — FERROUS SULFATE 325(65) MG
0 TABLET ORAL
Refills: 0 | DISCHARGE

## 2024-08-20 RX ORDER — HUMAN INSULIN 100 [IU]/ML
15 INJECTION, SUSPENSION SUBCUTANEOUS ONCE
Refills: 0 | Status: COMPLETED | OUTPATIENT
Start: 2024-08-21 | End: 2024-08-21

## 2024-08-20 RX ORDER — ONDANSETRON 2 MG/ML
4 INJECTION, SOLUTION INTRAMUSCULAR; INTRAVENOUS EVERY 8 HOURS
Refills: 0 | Status: DISCONTINUED | OUTPATIENT
Start: 2024-08-20 | End: 2024-08-25

## 2024-08-20 RX ORDER — ACETAMINOPHEN 325 MG/1
650 TABLET ORAL EVERY 6 HOURS
Refills: 0 | Status: DISCONTINUED | OUTPATIENT
Start: 2024-08-20 | End: 2024-08-25

## 2024-08-20 RX ORDER — GLUCAGON INJECTION, SOLUTION 1 MG/.2ML
1 INJECTION, SOLUTION SUBCUTANEOUS ONCE
Refills: 0 | Status: DISCONTINUED | OUTPATIENT
Start: 2024-08-20 | End: 2024-08-25

## 2024-08-20 RX ORDER — SODIUM CHLORIDE 9 MG/ML
1000 INJECTION INTRAMUSCULAR; INTRAVENOUS; SUBCUTANEOUS ONCE
Refills: 0 | Status: DISCONTINUED | OUTPATIENT
Start: 2024-08-20 | End: 2024-08-20

## 2024-08-20 RX ORDER — ENOXAPARIN SODIUM 100 MG/ML
40 INJECTION SUBCUTANEOUS EVERY 12 HOURS
Refills: 0 | Status: DISCONTINUED | OUTPATIENT
Start: 2024-08-20 | End: 2024-08-25

## 2024-08-20 RX ORDER — INSULIN GLARGINE 100 [IU]/ML
30 INJECTION, SOLUTION SUBCUTANEOUS ONCE
Refills: 0 | Status: COMPLETED | OUTPATIENT
Start: 2024-08-20 | End: 2024-08-20

## 2024-08-20 RX ORDER — AMLODIPINE BESYLATE 10 MG/1
2.5 TABLET ORAL DAILY
Refills: 0 | Status: DISCONTINUED | OUTPATIENT
Start: 2024-08-20 | End: 2024-08-25

## 2024-08-20 RX ORDER — MAGNESIUM, ALUMINUM HYDROXIDE 200-225/5
30 SUSPENSION, ORAL (FINAL DOSE FORM) ORAL EVERY 4 HOURS
Refills: 0 | Status: DISCONTINUED | OUTPATIENT
Start: 2024-08-20 | End: 2024-08-25

## 2024-08-20 RX ORDER — DEXTROSE 15 G/33 G
12.5 GEL IN PACKET (GRAM) ORAL ONCE
Refills: 0 | Status: DISCONTINUED | OUTPATIENT
Start: 2024-08-20 | End: 2024-08-25

## 2024-08-20 RX ORDER — NEBIVOLOL 20 MG/1
1 TABLET ORAL
Refills: 0 | DISCHARGE

## 2024-08-20 RX ORDER — ENOXAPARIN SODIUM 100 MG/ML
40 INJECTION SUBCUTANEOUS EVERY 24 HOURS
Refills: 0 | Status: DISCONTINUED | OUTPATIENT
Start: 2024-08-20 | End: 2024-08-20

## 2024-08-20 RX ORDER — DEXTROSE 15 G/33 G
15 GEL IN PACKET (GRAM) ORAL ONCE
Refills: 0 | Status: DISCONTINUED | OUTPATIENT
Start: 2024-08-20 | End: 2024-08-25

## 2024-08-20 RX ORDER — NEBIVOLOL 20 MG/1
10 TABLET ORAL DAILY
Refills: 0 | Status: DISCONTINUED | OUTPATIENT
Start: 2024-08-20 | End: 2024-08-25

## 2024-08-20 RX ORDER — AMLODIPINE BESYLATE 10 MG/1
1 TABLET ORAL
Refills: 0 | DISCHARGE

## 2024-08-20 RX ADMIN — Medication 2: at 17:02

## 2024-08-20 RX ADMIN — ENOXAPARIN SODIUM 40 MILLIGRAM(S): 100 INJECTION SUBCUTANEOUS at 17:02

## 2024-08-20 RX ADMIN — SODIUM CHLORIDE 1000 MILLILITER(S): 9 INJECTION INTRAMUSCULAR; INTRAVENOUS; SUBCUTANEOUS at 04:56

## 2024-08-20 RX ADMIN — Medication 5 UNIT(S): at 04:56

## 2024-08-20 RX ADMIN — Medication 5 UNIT(S): at 06:39

## 2024-08-20 RX ADMIN — Medication 4 UNIT(S): at 17:02

## 2024-08-20 RX ADMIN — INSULIN GLARGINE 30 UNIT(S): 100 INJECTION, SOLUTION SUBCUTANEOUS at 06:40

## 2024-08-20 RX ADMIN — SODIUM CHLORIDE 1000 MILLILITER(S): 9 INJECTION INTRAMUSCULAR; INTRAVENOUS; SUBCUTANEOUS at 02:12

## 2024-08-20 RX ADMIN — Medication 1000 MILLILITER(S): at 08:35

## 2024-08-20 RX ADMIN — Medication 1: at 22:23

## 2024-08-20 RX ADMIN — AMLODIPINE BESYLATE 2.5 MILLIGRAM(S): 10 TABLET ORAL at 13:14

## 2024-08-20 NOTE — H&P ADULT - NSHPLABSRESULTS_GEN_ALL_CORE
LABS:                          13.1   7.88  )-----------( 188      ( 20 Aug 2024 02:05 )             40.4     08-20    139  |  106  |  14  ----------------------------<  280<H>  3.8   |  22  |  0.65    Ca    9.1      20 Aug 2024 10:21  Phos  2.6     08-20  Mg     1.8     08-20    TPro  7.5  /  Alb  4.2  /  TBili  0.3  /  DBili  x   /  AST  12  /  ALT  18  /  AlkPhos  234<H>  08-20    LIVER FUNCTIONS - ( 20 Aug 2024 02:05 )  Alb: 4.2 g/dL / Pro: 7.5 g/dL / ALK PHOS: 234 U/L / ALT: 18 U/L / AST: 12 U/L / GGT: x             Urinalysis Basic - ( 20 Aug 2024 10:21 )    Color: x / Appearance: x / SG: x / pH: x  Gluc: 280 mg/dL / Ketone: x  / Bili: x / Urobili: x   Blood: x / Protein: x / Nitrite: x   Leuk Esterase: x / RBC: x / WBC x   Sq Epi: x / Non Sq Epi: x / Bacteria: x

## 2024-08-20 NOTE — H&P ADULT - NSHPPHYSICALEXAM_GEN_ALL_CORE
LOS:     VITALS:   T(C): 36.7 (08-20-24 @ 12:40), Max: 37.3 (08-20-24 @ 01:25)  HR: 65 (08-20-24 @ 12:40) (57 - 81)  BP: 158/100 (08-20-24 @ 12:40) (127/94 - 187/104)  RR: 16 (08-20-24 @ 12:40) (15 - 18)  SpO2: 97% (08-20-24 @ 12:40) (96% - 99%)    GENERAL: NAD, lying in bed comfortably  HEAD:  Atraumatic, Normocephalic  EYES: EOMI, PERRLA, conjunctiva and sclera clear  ENT: Moist mucous membranes  NECK: Supple, No JVD  CHEST/LUNG: Clear to auscultation bilaterally; No rales, rhonchi, wheezing, or rubs. Unlabored respirations  HEART: Regular rate and rhythm; No murmurs, rubs, or gallops  ABDOMEN: BSx4; Soft, nontender, nondistended  EXTREMITIES:  2+ Peripheral Pulses, brisk capillary refill. No clubbing, cyanosis, or edema  NERVOUS SYSTEM:  A&Ox3, no focal deficits   SKIN: No rashes or lesions

## 2024-08-20 NOTE — CONSULT NOTE ADULT - PROBLEM SELECTOR RECOMMENDATION 2
- C/w with home amlodipine and nebivolol    Note incomplete until discussed with attending - C/w with home amlodipine and nebivolol    Discussed w/ Dr. Verma attending

## 2024-08-20 NOTE — CONSULT NOTE ADULT - ATTENDING COMMENTS
Agree with Dr. Madison's assessment and plan as outlined above. Reviewed all pertinent labs, and imaging studies. Modifications made as indicated above. 53 F with history of prediabetes, rCC s/p nephrectomy here for polyuria. Endocrinology consulted for diabetes management. A1C 12. Start basal/bolus as above while inpatient. currently not on any dM meds inpatient, likely can discharge on basal insulin+ metformin 500 mg BID + GLP1RA outaptient. Follow up outpatient with her endocrinologist. rest of the plan as above. Patient is high risk with high level decision making due to uncontrolled diabetes with A1C>10 which places patient at high risk for cardiovascular and cerebrovascular events. Patient with lability of glucose requiring close monitoring and insulin adjustments.

## 2024-08-20 NOTE — H&P ADULT - PROBLEM SELECTOR PLAN 5
DVT ppx - Lovenox  Diet - CC  Dispo - pending recs from endocrine for DM management  Code status - full code

## 2024-08-20 NOTE — H&P ADULT - PROBLEM SELECTOR PLAN 2
On Bystolic, amlodipine at home  - hold Bystolic for now, as not on our formulary and BP acceptable  - c/w home amlodipine

## 2024-08-20 NOTE — ED PROVIDER NOTE - OBJECTIVE STATEMENT
53-year-old female past medical history of HTN, pre- DM presents emergency department for 1 week of increased thirst and urination.  Patient states worsening over the last 1 day.  Patient also endorsing some blurry vision and lightheadedness.  Patient states has been under a lot of stress.  Patient denies fevers, chills, headache, chest pain, trouble breathing, abdominal pain, nausea/vomiting, diarrhea/constipation, dysuria, hematuria.

## 2024-08-20 NOTE — ED PROVIDER NOTE - ATTENDING CONTRIBUTION TO CARE
MD Meza:  patient seen and evaluated personally.   I agree with the History & Physical,  Impression & Plan other than what was detailed in my note.  MD Meza  54 y/o f hx of htn, pre dm, non compliant w/ antihypertensives (amlodipine 2.5 mg qhs, nevidilol 10 mg qhs) did take before coming in, presentng to ed w/ high blood glucose. Pt states that she has been stressed d/t family issues so she has been eating a lot of candies, sweets and other sugars as this is one of her coping mechanisms, states the past day she has had increased thirst, increased urination, some lightheadedness,  also had some blurry vision, not on any anti dm agents, no f/c no n/v, no abd pain ,afebrile vitals stable  non toxic well appearing, NC/AT,  conjunctiva non conjected, sclera anicteric, moist mucous membranes, neck supple, heart sounds, normal, no mrg, lungs cta b/l no wrr, abd soft non distended w/ no tenderness, no visual deformities of extremities, axox3, , normal mood and affect , possible hyperglycemia vs dka, likely seocndary to poor diet, will check ua for uti, no other s/o infectious etiolgy,

## 2024-08-20 NOTE — H&P ADULT - NSHPREVIEWOFSYSTEMS_GEN_ALL_CORE
REVIEW OF SYSTEMS:    CONSTITUTIONAL: No weakness, fevers or chills  EYES: No visual changes; no sclera icterics, no pain or drainage  ENT:  No vertigo, +throat pain  NECK: No pain or stiffness  RESPIRATORY: No cough, wheezing, hemoptysis; No shortness of breath  CARDIOVASCULAR: No chest pain or palpitations  GASTROINTESTINAL: No abdominal or epigastric pain. No nausea, vomiting, or hematemesis; No diarrhea or constipation. No melena or hematochezia.  GENITOURINARY: No dysuria or hematuria, +urinary frequency  NEUROLOGICAL: No numbness or weakness  SKIN: No itching, rashes  Psych: No anxiety or depression

## 2024-08-20 NOTE — ED PROVIDER NOTE - CLINICAL SUMMARY MEDICAL DECISION MAKING FREE TEXT BOX
53-year-old female past medical history of HTN, pre- DM presents emergency department for 1 week of increased thirst and urination. Hyperglycemia on intial FS. + dry mucous membranes on exam. Most likely new onset DM. Will get labs, EKG, UA/UC, VBG to eval for DKA. Will give IVF and insulin. Dispo likely admission vs CDU.

## 2024-08-20 NOTE — H&P ADULT - ASSESSMENT
53F with hx of HTN, renal cancer s/p nephrectomy (states cured, never received chemo/radiation), chronic neck pain, preDM, who presents to the hospital with HHS.

## 2024-08-20 NOTE — CONSULT NOTE ADULT - SUBJECTIVE AND OBJECTIVE BOX
HPI: 53 yoF w/ pmh of htn, pre-diabetes, RCC s/p L nephrectomy here for lethargy and increased urination. Patient states that in the last 3-4 days she has been urinating >15 times per day with significant volume, accompanied by lethargy, lightheadedness, and blurry vision. She also complains of a sugary taste in her mouth that resolved with drinking alkaline water. This is the first time that this has happened to her. She states that she has also had recent deaths in the family so she has been more depressed, resulting in worsening diet and increased stressed. Denies nausea, vomiting, diarrhea, recent illness, significant weight gain/loss.       ENDOCRINE HX:   Patients states was diagnosed w/ pre-diabetes around 5-6 years ago. Last a1c she believes was 12/2023, thinks it was 6.6 or 6.7. When asked about medications, patient denies every being on a diabetes mediation. States that her doctor recommended lifestyle changes and would hold off on starting medication. When asked about other endo disorders, states that she has been seeing Dr. Wanda Georges for thyroid nodules that were dx years ago and has been monitored     Denies history of heart disease, stroke  Has had multiple UTI's       PAST MEDICAL & SURGICAL HISTORY:  Fibromyalgia      Herniated disc, cervical      Essential hypertension      Renal cell carcinoma of left kidney      HTN (hypertension)      H/O gastroesophageal reflux (GERD)      H/O kidney removal          FAMILY HISTORY:  Mom had db toward end of life   Dad's relatives had hx of diabetes         Social History: Lives with her . Never smoked, drinks rarely (few times per year)  - Diet: Consists of two meals per day. First meal is usually leftovers/cereal/breakfast sandwich salad, protein, vegetables if later. Dinner is usually pizza, [employer], salad, fried chicken, french fries, lamb. Estimates that she eats out 14x/week   - Exercise: walking during shopping or errands     Outpatient Medications:  - Amlodipine 2.5 mg qD  - Bystolic 10mg     MEDICATIONS  (STANDING):  amLODIPine   Tablet 2.5 milliGRAM(s) Oral daily  multivitamin 1 Tablet(s) Oral daily    MEDICATIONS  (PRN):      Allergies    penicillins (Hives)  aspirin (Unknown)  penicillin (Unknown)  NSAIDs (Other)  latex (Other)    Intolerances      Review of Systems:  Constitutional: No fever  Eyes: blurry vision  Neuro: No tremors  HEENT: No pain  Cardiovascular: No chest pain, palpitations  Respiratory: No SOB, no cough  GI: No nausea, vomiting, abdominal pain  : urinary frequency  Skin: no rash  Psych: depression  Endocrine: polyuria, polydipsia  Hem/lymph: no swelling  Osteoporosis: no fractures    ALL OTHER SYSTEMS REVIEWED AND NEGATIVE    PHYSICAL EXAM:  VITALS: T(C): 36.7 (08-20-24 @ 12:40)  T(F): 98 (08-20-24 @ 12:40), Max: 99.1 (08-20-24 @ 01:25)  HR: 65 (08-20-24 @ 12:40) (57 - 81)  BP: 158/100 (08-20-24 @ 12:40) (127/94 - 187/104)  RR:  (15 - 18)  SpO2:  (96% - 99%)  Wt(kg): --  GENERAL: NAD, well-groomed, well-developed  EYES: No proptosis, no lid lag, anicteric  HEENT:  Atraumatic, Normocephalic, moist mucous membranes, no thyromegaly   RESPIRATORY: Normal respiratory effort; no audible wheezing  SKIN: Dry, intact, No rashes or lesions  MUSCULOSKELETAL: Full range of motion, normal strength  NEURO: sensation intact, extraocular movements intact, no tremor  PSYCH: Alert and oriented x 3, normal affect, normal mood  CUSHING'S SIGNS: no striae      CAPILLARY BLOOD GLUCOSE      POCT Blood Glucose.: 234 mg/dL (20 Aug 2024 12:06)  POCT Blood Glucose.: 418 mg/dL (20 Aug 2024 06:37)  POCT Blood Glucose.: >600 mg/dL (20 Aug 2024 04:47)  POCT Blood Glucose.: >600 mg/dL (20 Aug 2024 02:11)                            13.1   7.88  )-----------( 188      ( 20 Aug 2024 02:05 )             40.4       08-20    139  |  106  |  14  ----------------------------<  280<H>  3.8   |  22  |  0.65    eGFR: 106    Ca    9.1      08-20  Mg     1.8     08-20  Phos  2.6     08-20    TPro  7.5  /  Alb  4.2  /  TBili  0.3  /  DBili  x   /  AST  12  /  ALT  18  /  AlkPhos  234<H>  08-20      Thyroid Function Tests:              Radiology:

## 2024-08-20 NOTE — ED ADULT NURSE REASSESSMENT NOTE - NS ED NURSE REASSESS COMMENT FT1
Pt f/s 234. RN confirmed with ACP Johnny that patient can eat at this time. Pt provided with consistent carb meal tray.

## 2024-08-20 NOTE — CONSULT NOTE ADULT - PROBLEM SELECTOR RECOMMENDATION 9
Blood glucose > 600  polyuria and polydipsia likely 2/2 to T2DM w/ HHS    - Repeat A1C   - Lantus 25 units at bedtime   - Lispro 4 units TIDAC, hold if NPO  - Can consider discharge on basal insulin    - Follow up with Dr. Georges outpatient Blood glucose > 600  polyuria and polydipsia likely 2/2 to T2DM w/ HHS    - Repeat A1C   - NPH 15 U on 8/21 AM, as pt received lantus today  - Lantus 30 u 8/22 PM at bedtime, continue w/ afterwards   - Lispro 10 units TIDAC, hold if NPO  - Can consider discharge on basal insulin pending insulin req + metformin 500 BID + GLP-1 agonist   - Follow up with Dr. Georges outpatient Blood glucose > 600  polyuria and polydipsia likely 2/2 to T2DM w/ HHS    - Repeat A1C   - NPH 15 U on 8/21 AM, as pt received lantus today  - Lantus 30 u 8/21 PM at bedtime, continue w/ afterwards   - Lispro 10 units TIDAC, hold if NPO  - Can consider discharge on basal insulin pending insulin req + metformin 500 BID + GLP-1 agonist   - Follow up with Dr. Georges outpatient

## 2024-08-20 NOTE — ED PROVIDER NOTE - PROGRESS NOTE DETAILS
Jany Rey PGY3: FS >600, gave admelog. Labs non consistent with DKA. Plan for admission for glucose control and new onset DM.

## 2024-08-20 NOTE — H&P ADULT - PROBLEM SELECTOR PLAN 4
Reports recent life stressors and depression, though able to speak with therapist and family for support. Does not feel she needs to speak to psychiatrist or take medications

## 2024-08-20 NOTE — ED ADULT NURSE REASSESSMENT NOTE - NS ED NURSE REASSESS COMMENT FT1
Report received from KIMBERLY Jo, pt found in position of comfort in bed. Pt is AOx4, MAEx4, respirations even and unlabored, abd soft nontender/nondistended, skin warm dry and normal for race. Vital signs within normal limits. Pt denies pain/discomfort at this time. Patient safety maintained, bed is in lowest position, wheels locked, and side rails raised. Patient oriented to call bell, and call bell is within reach.

## 2024-08-20 NOTE — H&P ADULT - PROBLEM SELECTOR PLAN 1
Presented with polyuria, polydipsia, and glucose >600, improved with inuslin and 4L fluids in the ED  Suspect 2/2 dietary indiscretion +/- steroid injection  - f/u A1c  - endocrine consulted, recs pending  - CC diet  - ISS qac and qhs  - discharge recs pending

## 2024-08-20 NOTE — H&P ADULT - HISTORY OF PRESENT ILLNESS
53F with hx of HTN, renal cancer s/p nephrectomy (states cured, never received chemo/radiation), chronic neck pain, preDM, who presents to the hospital with polyuria and polydipsia. States that she has been eating lots of sweets as a coping mechanism given many recent stressors in her life. She additionally states she received a steroid injection in her cervical spine this past Thursday which she feels may have exacerbated her symptoms. States she was urinating what felt like constantly in the days leading up to admission. She was unable to check her glucose at home. Has been told she has preDM that was diet controlled. Isn't sure what her HbA1c is. Her symptoms have improved markedly since receiving fluids and insulin in the ED.

## 2024-08-20 NOTE — ED PROVIDER NOTE - PHYSICAL EXAMINATION
Constitutional: VS reviewed. Alert and orientedx3, well appearing, no apparent distress  HEENT: Atraumatic, EOMI, PERRL, dry mecous membranes  CV: RRR  Lungs: Clear and equal bilaterally, no wheezes, rales or crackles  Abdomen: Soft, nondistended, nontender  MSK: No deformities  Skin: Warm and dry. As visualized no rashes, lesions, bruising or erythema  Neuro: Strength and sensation intact.   Lymph: No pitting edema in extremities.

## 2024-08-20 NOTE — CONSULT NOTE ADULT - ASSESSMENT
Blood glucose > 600  polyuria and polydipsia likely 2/2 to T2DM w/ HHS    - Repeat A1C   - NPH 15 U on 8/21 AM, as pt received lantus today  - Lantus 30 u 8/21 PM at bedtime, continue w/ afterwards   - Lispro 10 units TIDAC, hold if NPO  - Can consider discharge on basal insulin pending insulin req + metformin 500 BID + GLP-1 agonist   - Follow up with Dr. Georges outpatient    # HTN   - - C/w with home amlodipine and nebivolol    # HLD   - check fasting lipid profile   - Would benefit from mod intensity statin     # Class 3 obesity   - BMI 44  - would benefit from GLP1RA outpatient   Discussed w/ Dr. Verma attending

## 2024-08-20 NOTE — ED ADULT NURSE NOTE - OBJECTIVE STATEMENT
Lauro Joyce(Resident) Pt is a 52yo F w/ PMH prediabetes, HTN (not taking meds) presents to ED c/o increased thirst, fatigue, and HI fingerstick. Pt brought in via EMS, EMS states "Her sugars just read HI". Pt states "For a week or two, I've felt very off. But since friday I've been very thirsty, been urinating a ton, and I've been having a fruity taste in my mouth. I have some blurry vision as well. I eat a lot of sugar even though I know I shouldn't, and I haven't taken my meds in a while for my BP." Denies chest pain, SOB, blood in stools, hematuria, dysuria, urinary frequency, sick contacts. A&Ox3. Strong peripheral pulses. Neurologically intact and follows commands. Pulse motor and sensation present and equal to all 4 extremities. Abdomen soft, nondistended, nontender to palpation. NSR on cardiac monitor. Skin warm dry intact and normal for ethnicity. Ambulatory with steady gait in ED. Stretcher locked and in lowest position, appropriate side rails up. Pt instructed to notify RN if assistance is needed. Pt is a 54yo F w/ PMH prediabetes, HTN (not taking meds) presents to ED c/o increased thirst, fatigue, and HI fingerstick. Pt brought in via EMS, EMS states "Her sugars just read HI". Pt states "For a week or two, I've felt very off. But since friday I've been very thirsty, been urinating a ton, and I've been having a fruity taste in my mouth. I have some blurry vision as well. I eat a lot of sugar even though I know I shouldn't, and I haven't taken my meds in a while for my BP." Denies chest pain, SOB, blood in stools, hematuria, sick contacts. A&Ox3. Strong peripheral pulses. Neurologically intact and follows commands. Pulse motor and sensation present and equal to all 4 extremities. Abdomen soft, nondistended, nontender to palpation. NSR on cardiac monitor. Skin warm dry intact and normal for ethnicity. Ambulatory with steady gait in ED. Stretcher locked and in lowest position, appropriate side rails up. Pt instructed to notify RN if assistance is needed.

## 2024-08-21 LAB
ALBUMIN SERPL ELPH-MCNC: 4 G/DL — SIGNIFICANT CHANGE UP (ref 3.3–5)
ALP SERPL-CCNC: 147 U/L — HIGH (ref 40–120)
ALT FLD-CCNC: 16 U/L — SIGNIFICANT CHANGE UP (ref 10–45)
ANION GAP SERPL CALC-SCNC: 13 MMOL/L — SIGNIFICANT CHANGE UP (ref 5–17)
AST SERPL-CCNC: 13 U/L — SIGNIFICANT CHANGE UP (ref 10–40)
BASOPHILS # BLD AUTO: 0.05 K/UL — SIGNIFICANT CHANGE UP (ref 0–0.2)
BASOPHILS NFR BLD AUTO: 0.7 % — SIGNIFICANT CHANGE UP (ref 0–2)
BILIRUB SERPL-MCNC: 0.5 MG/DL — SIGNIFICANT CHANGE UP (ref 0.2–1.2)
BUN SERPL-MCNC: 16 MG/DL — SIGNIFICANT CHANGE UP (ref 7–23)
CALCIUM SERPL-MCNC: 9.5 MG/DL — SIGNIFICANT CHANGE UP (ref 8.4–10.5)
CHLORIDE SERPL-SCNC: 103 MMOL/L — SIGNIFICANT CHANGE UP (ref 96–108)
CHOLEST SERPL-MCNC: 235 MG/DL — HIGH
CO2 SERPL-SCNC: 24 MMOL/L — SIGNIFICANT CHANGE UP (ref 22–31)
CREAT SERPL-MCNC: 0.83 MG/DL — SIGNIFICANT CHANGE UP (ref 0.5–1.3)
CULTURE RESULTS: SIGNIFICANT CHANGE UP
EGFR: 85 ML/MIN/1.73M2 — SIGNIFICANT CHANGE UP
EOSINOPHIL # BLD AUTO: 0.16 K/UL — SIGNIFICANT CHANGE UP (ref 0–0.5)
EOSINOPHIL NFR BLD AUTO: 2.1 % — SIGNIFICANT CHANGE UP (ref 0–6)
GLUCOSE BLDC GLUCOMTR-MCNC: 216 MG/DL — HIGH (ref 70–99)
GLUCOSE BLDC GLUCOMTR-MCNC: 216 MG/DL — HIGH (ref 70–99)
GLUCOSE BLDC GLUCOMTR-MCNC: 237 MG/DL — HIGH (ref 70–99)
GLUCOSE BLDC GLUCOMTR-MCNC: 286 MG/DL — HIGH (ref 70–99)
GLUCOSE SERPL-MCNC: 307 MG/DL — HIGH (ref 70–99)
HCT VFR BLD CALC: 42 % — SIGNIFICANT CHANGE UP (ref 34.5–45)
HDLC SERPL-MCNC: 77 MG/DL — SIGNIFICANT CHANGE UP
HGB BLD-MCNC: 13.3 G/DL — SIGNIFICANT CHANGE UP (ref 11.5–15.5)
IMM GRANULOCYTES NFR BLD AUTO: 0.9 % — SIGNIFICANT CHANGE UP (ref 0–0.9)
LIPID PNL WITH DIRECT LDL SERPL: 129 MG/DL — HIGH
LYMPHOCYTES # BLD AUTO: 3.03 K/UL — SIGNIFICANT CHANGE UP (ref 1–3.3)
LYMPHOCYTES # BLD AUTO: 39.8 % — SIGNIFICANT CHANGE UP (ref 13–44)
MAGNESIUM SERPL-MCNC: 1.8 MG/DL — SIGNIFICANT CHANGE UP (ref 1.6–2.6)
MCHC RBC-ENTMCNC: 27.8 PG — SIGNIFICANT CHANGE UP (ref 27–34)
MCHC RBC-ENTMCNC: 31.7 GM/DL — LOW (ref 32–36)
MCV RBC AUTO: 87.7 FL — SIGNIFICANT CHANGE UP (ref 80–100)
MONOCYTES # BLD AUTO: 0.43 K/UL — SIGNIFICANT CHANGE UP (ref 0–0.9)
MONOCYTES NFR BLD AUTO: 5.7 % — SIGNIFICANT CHANGE UP (ref 2–14)
NEUTROPHILS # BLD AUTO: 3.87 K/UL — SIGNIFICANT CHANGE UP (ref 1.8–7.4)
NEUTROPHILS NFR BLD AUTO: 50.8 % — SIGNIFICANT CHANGE UP (ref 43–77)
NON HDL CHOLESTEROL: 159 MG/DL — HIGH
NRBC # BLD: 0 /100 WBCS — SIGNIFICANT CHANGE UP (ref 0–0)
PHOSPHATE SERPL-MCNC: 3 MG/DL — SIGNIFICANT CHANGE UP (ref 2.5–4.5)
PLATELET # BLD AUTO: 205 K/UL — SIGNIFICANT CHANGE UP (ref 150–400)
POTASSIUM SERPL-MCNC: 4.3 MMOL/L — SIGNIFICANT CHANGE UP (ref 3.5–5.3)
POTASSIUM SERPL-SCNC: 4.3 MMOL/L — SIGNIFICANT CHANGE UP (ref 3.5–5.3)
PROT SERPL-MCNC: 7.3 G/DL — SIGNIFICANT CHANGE UP (ref 6–8.3)
RBC # BLD: 4.79 M/UL — SIGNIFICANT CHANGE UP (ref 3.8–5.2)
RBC # FLD: 13.4 % — SIGNIFICANT CHANGE UP (ref 10.3–14.5)
SODIUM SERPL-SCNC: 140 MMOL/L — SIGNIFICANT CHANGE UP (ref 135–145)
SPECIMEN SOURCE: SIGNIFICANT CHANGE UP
TRIGL SERPL-MCNC: 170 MG/DL — HIGH
WBC # BLD: 7.61 K/UL — SIGNIFICANT CHANGE UP (ref 3.8–10.5)
WBC # FLD AUTO: 7.61 K/UL — SIGNIFICANT CHANGE UP (ref 3.8–10.5)

## 2024-08-21 PROCEDURE — 99233 SBSQ HOSP IP/OBS HIGH 50: CPT

## 2024-08-21 PROCEDURE — 99232 SBSQ HOSP IP/OBS MODERATE 35: CPT

## 2024-08-21 RX ORDER — INSULIN GLARGINE 100 [IU]/ML
33 INJECTION, SOLUTION SUBCUTANEOUS AT BEDTIME
Refills: 0 | Status: DISCONTINUED | OUTPATIENT
Start: 2024-08-21 | End: 2024-08-22

## 2024-08-21 RX ADMIN — HUMAN INSULIN 15 UNIT(S): 100 INJECTION, SUSPENSION SUBCUTANEOUS at 12:35

## 2024-08-21 RX ADMIN — Medication 2: at 12:36

## 2024-08-21 RX ADMIN — ENOXAPARIN SODIUM 40 MILLIGRAM(S): 100 INJECTION SUBCUTANEOUS at 17:37

## 2024-08-21 RX ADMIN — Medication 2: at 17:34

## 2024-08-21 RX ADMIN — Medication 10 UNIT(S): at 13:00

## 2024-08-21 RX ADMIN — NEBIVOLOL 10 MILLIGRAM(S): 20 TABLET ORAL at 22:13

## 2024-08-21 RX ADMIN — INSULIN GLARGINE 33 UNIT(S): 100 INJECTION, SOLUTION SUBCUTANEOUS at 22:12

## 2024-08-21 RX ADMIN — AMLODIPINE BESYLATE 2.5 MILLIGRAM(S): 10 TABLET ORAL at 22:12

## 2024-08-21 RX ADMIN — ENOXAPARIN SODIUM 40 MILLIGRAM(S): 100 INJECTION SUBCUTANEOUS at 05:15

## 2024-08-21 RX ADMIN — Medication 13 UNIT(S): at 17:35

## 2024-08-21 RX ADMIN — Medication 1 TABLET(S): at 13:36

## 2024-08-21 RX ADMIN — Medication 10 UNIT(S): at 09:02

## 2024-08-21 RX ADMIN — Medication 3: at 09:01

## 2024-08-21 NOTE — DIETITIAN INITIAL EVALUATION ADULT - PERTINENT LABORATORY DATA
08-21    140  |  103  |  16  ----------------------------<  307<H>  4.3   |  24  |  0.83    Ca    9.5      21 Aug 2024 08:14  Phos  3.0     08-21  Mg     1.8     08-21    TPro  7.3  /  Alb  4.0  /  TBili  0.5  /  DBili  x   /  AST  13  /  ALT  16  /  AlkPhos  147<H>  08-21  POCT Blood Glucose.: 216 mg/dL (08-21-24 @ 12:16)  A1C with Estimated Average Glucose Result: 12.6 % (08-20-24 @ 10:21)

## 2024-08-21 NOTE — DIETITIAN INITIAL EVALUATION ADULT - REASON INDICATOR FOR ASSESSMENT
Consult for "assessment, education, new diabetic"  Source: chart, nurse liaison, patient, patient's  at bedside  Chart reviewed, events noted

## 2024-08-21 NOTE — PROGRESS NOTE ADULT - SUBJECTIVE AND OBJECTIVE BOX
Lino Metcalf MD  Division of Hospital Medicine  Available via [facility] teams  ---------------------------------------------------------    TIMOWALDEMAR OVALLE  53y  Female      Patient is a 53y old  Female who presents with a chief complaint of HHS (20 Aug 2024 13:13)      INTERVAL HPI/OVERNIGHT EVENTS:  Seen at bedside. Upset by noise neighbor is making. Less urination and thirst      REVIEW OF SYSTEMS: 10 point ROS negative unless listed above    T(C): 36.8 (08-21-24 @ 04:24), Max: 37.2 (08-21-24 @ 00:05)  HR: 62 (08-21-24 @ 04:24) (62 - 84)  BP: 154/76 (08-21-24 @ 04:24) (136/91 - 158/100)  RR: 19 (08-21-24 @ 04:24) (16 - 19)  SpO2: 96% (08-21-24 @ 04:24) (96% - 100%)  Wt(kg): --Vital Signs Last 24 Hrs  T(C): 36.8 (21 Aug 2024 04:24), Max: 37.2 (21 Aug 2024 00:05)  T(F): 98.2 (21 Aug 2024 04:24), Max: 98.9 (21 Aug 2024 00:05)  HR: 62 (21 Aug 2024 04:24) (62 - 84)  BP: 154/76 (21 Aug 2024 04:24) (136/91 - 158/100)  BP(mean): 105 (20 Aug 2024 19:07) (105 - 118)  RR: 19 (21 Aug 2024 04:24) (16 - 19)  SpO2: 96% (21 Aug 2024 04:24) (96% - 100%)    Parameters below as of 21 Aug 2024 04:24  Patient On (Oxygen Delivery Method): room air        PHYSICAL EXAM:  GENERAL: NAD, well-groomed, well-developed  CHEST/LUNG: Clear to auscultation bilaterally; No rales, rhonchi, wheezing, or rubs  HEART: Regular rate and rhythm; No murmurs, rubs, or gallops  ABDOMEN: Soft, Nontender, Nondistended; Bowel sounds present.    PSYCH: Alert & Oriented x3        LABS:                        13.3   7.61  )-----------( 205      ( 21 Aug 2024 08:14 )             42.0     08-21    140  |  103  |  16  ----------------------------<  307<H>  4.3   |  24  |  0.83    Ca    9.5      21 Aug 2024 08:14  Phos  3.0     08-21  Mg     1.8     08-21    TPro  7.3  /  Alb  4.0  /  TBili  0.5  /  DBili  x   /  AST  13  /  ALT  16  /  AlkPhos  147<H>  08-21      Urinalysis Basic - ( 21 Aug 2024 08:14 )    Color: x / Appearance: x / SG: x / pH: x  Gluc: 307 mg/dL / Ketone: x  / Bili: x / Urobili: x   Blood: x / Protein: x / Nitrite: x   Leuk Esterase: x / RBC: x / WBC x   Sq Epi: x / Non Sq Epi: x / Bacteria: x      CAPILLARY BLOOD GLUCOSE      POCT Blood Glucose.: 286 mg/dL (21 Aug 2024 08:15)  POCT Blood Glucose.: 295 mg/dL (20 Aug 2024 22:03)  POCT Blood Glucose.: 231 mg/dL (20 Aug 2024 16:55)  POCT Blood Glucose.: 234 mg/dL (20 Aug 2024 12:06)        Urinalysis Basic - ( 21 Aug 2024 08:14 )    Color: x / Appearance: x / SG: x / pH: x  Gluc: 307 mg/dL / Ketone: x  / Bili: x / Urobili: x   Blood: x / Protein: x / Nitrite: x   Leuk Esterase: x / RBC: x / WBC x   Sq Epi: x / Non Sq Epi: x / Bacteria: x        RADIOLOGY & ADDITIONAL TESTS:    Imaging Personally Reviewed:  [ ] YES  [ ] NO

## 2024-08-21 NOTE — DIETITIAN INITIAL EVALUATION ADULT - PERSON TAUGHT/METHOD
adequate caloric/protein intake w/ food sources reviewed, extended education for consistent carbohydrate/DM diet guidelines w/ literature provided as reference, all questions were answered/verbal instruction/written material/teach back - (Patient repeats in own words)/patient instructed/spouse instructed

## 2024-08-21 NOTE — DIETITIAN INITIAL EVALUATION ADULT - ETIOLOGY
reported increased caloric intake and unintentional wt gain endocrine dysfunction (new DM dx this admission per chart)

## 2024-08-21 NOTE — PROGRESS NOTE ADULT - SUBJECTIVE AND OBJECTIVE BOX
INTERVAL HPI/OVERNIGHT EVENTS:  Seen at the bedside. Tolerating diet well, denies nausea or vomiting. Blurry visiotn is improving. Polyuria and polydipsia also getting better.       Review of systems:   CONSTITUTIONAL:  Feels well, good appetite  CARDIOVASCULAR:  Negative for chest pain or palpitations  RESPIRATORY:  Negative for cough, or SOB   GASTROINTESTINAL:  Negative for nausea, vomiting, or abdominal pain  GENITOURINARY:  Negative frequency, urgency or dysuria     CAPILLARY BLOOD GLUCOSE      POCT Blood Glucose.: 237 mg/dL (21 Aug 2024 17:10)  POCT Blood Glucose.: 216 mg/dL (21 Aug 2024 12:16)  POCT Blood Glucose.: 286 mg/dL (21 Aug 2024 08:15)  POCT Blood Glucose.: 295 mg/dL (20 Aug 2024 22:03)       MEDICATIONS  (STANDING):  amLODIPine   Tablet 2.5 milliGRAM(s) Oral daily  dextrose 5%. 1000 milliLiter(s) (50 mL/Hr) IV Continuous <Continuous>  dextrose 5%. 1000 milliLiter(s) (100 mL/Hr) IV Continuous <Continuous>  dextrose 50% Injectable 25 Gram(s) IV Push once  dextrose 50% Injectable 25 Gram(s) IV Push once  dextrose 50% Injectable 12.5 Gram(s) IV Push once  enoxaparin Injectable 40 milliGRAM(s) SubCutaneous every 12 hours  glucagon  Injectable 1 milliGRAM(s) IntraMuscular once  insulin glargine Injectable (LANTUS) 33 Unit(s) SubCutaneous at bedtime  insulin lispro (ADMELOG) corrective regimen sliding scale   SubCutaneous at bedtime  insulin lispro (ADMELOG) corrective regimen sliding scale   SubCutaneous three times a day before meals  insulin lispro Injectable (ADMELOG) 13 Unit(s) SubCutaneous three times a day before meals  multivitamin 1 Tablet(s) Oral daily  nebivolol 10 milliGRAM(s) Oral daily    MEDICATIONS  (PRN):  acetaminophen     Tablet .. 650 milliGRAM(s) Oral every 6 hours PRN Temp greater or equal to 38C (100.4F), Mild Pain (1 - 3)  aluminum hydroxide/magnesium hydroxide/simethicone Suspension 30 milliLiter(s) Oral every 4 hours PRN Dyspepsia  dextrose Oral Gel 15 Gram(s) Oral once PRN Blood Glucose LESS THAN 70 milliGRAM(s)/deciliter  melatonin 3 milliGRAM(s) Oral at bedtime PRN Insomnia  ondansetron Injectable 4 milliGRAM(s) IV Push every 8 hours PRN Nausea and/or Vomiting      PHYSICAL EXAM  Vital Signs Last 24 Hrs  T(C): 36.9 (21 Aug 2024 19:39), Max: 37.2 (21 Aug 2024 00:05)  T(F): 98.4 (21 Aug 2024 19:39), Max: 98.9 (21 Aug 2024 00:05)  HR: 64 (21 Aug 2024 19:39) (54 - 70)  BP: 153/86 (21 Aug 2024 19:39) (137/86 - 157/84)  BP(mean): --  RR: 18 (21 Aug 2024 19:39) (18 - 19)  SpO2: 96% (21 Aug 2024 19:39) (96% - 99%)    Parameters below as of 21 Aug 2024 19:39  Patient On (Oxygen Delivery Method): room air        GENERAL: feMale laying in bed in NAD  RESPIRATORY: nonlabored breathing, no accessory muscle use  Extremities: Warm, no edema in all 4 exts   NEURO: A&O X3    LABS:                        13.3   7.61  )-----------( 205      ( 21 Aug 2024 08:14 )             42.0     08-21    140  |  103  |  16  ----------------------------<  307<H>  4.3   |  24  |  0.83    Ca    9.5      21 Aug 2024 08:14  Phos  3.0     08-21  Mg     1.8     08-21    TPro  7.3  /  Alb  4.0  /  TBili  0.5  /  DBili  x   /  AST  13  /  ALT  16  /  AlkPhos  147<H>  08-21      Urinalysis Basic - ( 21 Aug 2024 08:14 )    Color: x / Appearance: x / SG: x / pH: x  Gluc: 307 mg/dL / Ketone: x  / Bili: x / Urobili: x   Blood: x / Protein: x / Nitrite: x   Leuk Esterase: x / RBC: x / WBC x   Sq Epi: x / Non Sq Epi: x / Bacteria: x

## 2024-08-21 NOTE — DIETITIAN INITIAL EVALUATION ADULT - ORAL INTAKE PTA/DIET HISTORY
Patient reports she has been eating well PTA, endorses having dietary indiscretion at times with different life stressors. Denied chewing/swallowing impairment or nausea/vomiting. Patient reports she will typically have 2 primary meals per day (dinner always a staple, will alternate either breakfast/dinner or lunch/dinner on most days). Patient reports having a history of pre-DM and felt that she may have potentially had DM for a while before having significant symptoms that brought her in for current admission. Patient endorses having high fluid intake during onset of her acute symptoms (mostly water or coconut water) and reports having intermittent muscle spasms that she noticed. Patient and patient's  very supportive of each other for helping to make positive nutrition/dietary changes. Patient very motivated to improve her overall health and achieve intentional weight loss, reports she was very recently trying to prepare herself for intentional weight loss prior to onset of acute symptoms.

## 2024-08-21 NOTE — DIETITIAN INITIAL EVALUATION ADULT - FACTORS AFF FOOD INTAKE
having a very 'sugary' taste in her mouth that would mostly be mitigated with drinking a large amount of water, reports this has now improved since admission with having BG levels treated/change in sense of smell or taste

## 2024-08-21 NOTE — DIETITIAN INITIAL EVALUATION ADULT - PROBLEM SELECTOR PLAN 5
DVT ppx - Lovenox  Diet - CC  Dispo - pending recs from endocrine for DM management  Code status - full code
Please followup with your primary care physician concerning your elevated blood pressures at your earliest convenience.

## 2024-08-21 NOTE — PROGRESS NOTE ADULT - ASSESSMENT
53 F with history of prediabetes, RCC s/p L nephrectomy here for polyuria. Endocrinology consulted for diabetes management. Patient is high risk with high level decision making due to uncontrolled diabetes with A1C>10 which places patient at high risk for cardiovascular and cerebrovascular events. Patient with lability of glucose requiring close monitoring and insulin adjustments.    # T2DM  with hyperglycemia   - Most recent Hemoglobin A1C 12.6  - Current FS ranges from 200-300  - Current diet: Carb consistent diet   - Please monitor blood glucose values TID AC & QHS while eating regular meals and Q6H while NPO  - Blood glucose goals pre-meal less than 140 mg/dL and random blood glucose less than 180 mg/dL  - Recommendations:  - fasting glucose above goal, increase insulin Glargine to 33 units QHS  - postprnaidal glucose above goal, increase insulin Lispro to 13 units TID with meals, hold if NPO or if eating less than 50% of meals  - Continue with low dose correctional scale TID with meals  - Continue with low dose correctional scale QHS    Discharge planning:   - Home DM medications: none, lifestyle modification only   - Discharge DM medications: basal insulin + metformin  mg BID with meals + ozempic 0.25 mg weekly (please send prescription for ozempic 0.25 mg weekly.     - Patient will follow up  her outpatient endocrinologist Dr. Raquel Georges outpatient    - patient will need Insulin teaching outpatient   - Please ensure patient has the following diabetes supplies:  - Glucometer (ACCU_CHECK yas Connect, Ascensia Contour Next EZ or One, Freestyle Freedome LITE or OneTouch Verio IQ)  - Glucometer test strips and lancets  (make sure compatible w/ glucometer), Dispense #100 (or #200) use as directed  - Lantus Solostar Pen (or Basaglar Kwikpen) __TBD__ units before bedtime (dose TBD)  - BD tiffany 4mm pen needles.   - Please verify with pharmacy that each script is covered before discharge.  - Patient will need opthalmology and podiatry follow up as outpatient     # HTN  - BP goal 130/80  - Manage per primary team     # HLD  -   -  Start  atorvastatin 20 mg QHS  - Goal LDL<70    # Class 3 obesity   - bMI 44.1   - Outpatient start GLP1RA as above     Thank you for the consult. Will continue to monitor. Please inform the endocrinology team at least 24 hours prior to intended discharge date.     Contact via pager or [facility] Teams during business hours. For follow up questions, discharge recommendations, or new consults please call answering service at [phone] (weekdays), [phone] (nights/weekends). For nonurgent matters, please email  [email or web address].         Maura Verma MD  Attending Physician   Department of Endocrinology, Diabetes and Metabolism   [facility]     For urgent matters before 9AM or after 5PM, or on weekends/holidays, please page the on call endocrine fellow.   For nonurgent matters, please email [email or web address] for assistance.

## 2024-08-21 NOTE — DIETITIAN INITIAL EVALUATION ADULT - OTHER INFO
NKFA per patient. Patient confirms her height to be 5'6". Patient reports her UBW is normally between 273-275lbs w/ no recent weight changes, reports being lighter during earlier stages of life and patient and  showed pictures of patient earlier in life before having gained weight later on in life. Recent weight history per [facility] growth chart as follows: 274lbs (11/16/2023), 274lbs (8/25/2023), 274lbs (5/22/2023), 266lbs (4/14/2023). Will monitor weight trend.    - HgbA1C 12.6% (8/20); patient w/ newly diagnosed DM during admission per MD. Endocrinology consulted for management. Lantus, SS and bolus admelog in place for insulin regimen. Originally w/ BGs >600s-700s, now most recently in lower 200s.  - Ordered for multivitamin.

## 2024-08-21 NOTE — DIETITIAN INITIAL EVALUATION ADULT - REASON FOR ADMISSION
Type 2 diabetes mellitus without complication    Per chart, patient is a 54 y/o female with PMH including HTN, chronic neck pain, h/o renal cancer (s/p nephrectomy, cured, never received chemo/radiation), pre-DM. Patient presents to [facility] w/ polyuria, polydipsia; patient reporting eat a lot of sweets as a coping mechanism given many recent stressors in her life and had recently received a steroid injection in her cervical spine that felt may have exacerbated her symptoms. Identified w/ HHS, new DM and admitted for further management per MD.

## 2024-08-21 NOTE — DIETITIAN INITIAL EVALUATION ADULT - PERTINENT MEDS FT
MEDICATIONS  (STANDING):  amLODIPine   Tablet 2.5 milliGRAM(s) Oral daily  dextrose 5%. 1000 milliLiter(s) (100 mL/Hr) IV Continuous <Continuous>  dextrose 5%. 1000 milliLiter(s) (50 mL/Hr) IV Continuous <Continuous>  dextrose 50% Injectable 25 Gram(s) IV Push once  dextrose 50% Injectable 12.5 Gram(s) IV Push once  dextrose 50% Injectable 25 Gram(s) IV Push once  enoxaparin Injectable 40 milliGRAM(s) SubCutaneous every 12 hours  glucagon  Injectable 1 milliGRAM(s) IntraMuscular once  insulin glargine Injectable (LANTUS) 33 Unit(s) SubCutaneous at bedtime  insulin lispro (ADMELOG) corrective regimen sliding scale   SubCutaneous at bedtime  insulin lispro (ADMELOG) corrective regimen sliding scale   SubCutaneous three times a day before meals  insulin lispro Injectable (ADMELOG) 13 Unit(s) SubCutaneous three times a day before meals  multivitamin 1 Tablet(s) Oral daily  nebivolol 10 milliGRAM(s) Oral daily    MEDICATIONS  (PRN):  acetaminophen     Tablet .. 650 milliGRAM(s) Oral every 6 hours PRN Temp greater or equal to 38C (100.4F), Mild Pain (1 - 3)  aluminum hydroxide/magnesium hydroxide/simethicone Suspension 30 milliLiter(s) Oral every 4 hours PRN Dyspepsia  dextrose Oral Gel 15 Gram(s) Oral once PRN Blood Glucose LESS THAN 70 milliGRAM(s)/deciliter  melatonin 3 milliGRAM(s) Oral at bedtime PRN Insomnia  ondansetron Injectable 4 milliGRAM(s) IV Push every 8 hours PRN Nausea and/or Vomiting

## 2024-08-21 NOTE — DIETITIAN INITIAL EVALUATION ADULT - REASON
Patient w/ good PO intake/appetite PTA w/ no recent weight fluctuations reported, eating well during admission, will monitor parameters

## 2024-08-21 NOTE — PROGRESS NOTE ADULT - PROBLEM SELECTOR PLAN 1
Presenting with uncontrolled BG and HHS  - A1c 12.6  - endocrine following, on  Lantus 30 u 8/21 PM at bedtime, Lispro 10 units TIDAC  - FS above goal, f/u endo rec's for further med titration  - CC diet  - ISS qac and qhs  - Endo f/u for d/c medication regimen  - Biabetic education, nutrition eval

## 2024-08-21 NOTE — DIETITIAN INITIAL EVALUATION ADULT - ENERGY INTAKE
Adequate (%) Patient reports eating well during admission currently. Patient reports trying to make appropriate menu choices for her meals and asked for advice to navigate menu and make good meal choices. Answered all questions, extended education provided w/ patient and patient's  with all questions answered.
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
## 2024-08-21 NOTE — CHART NOTE - NSCHARTNOTEFT_GEN_A_CORE
Patient with complaint of blurry vision. Patient states it started last night after drinking Cranberry Juice and has steadily improved but remains a "mild haze". Patient endorses visual changes in both eyes. Patient denies pain, diplopia, "floaters", decreased vision or any other complaints at this time. States she saw a Ophthalmologist 5 months ago with reports of unremarkable exam. Discussed with Dr. Metcalf, opthalmology consulted. Patient's case and events discussed with Dr. Celestin, who advised blurry vision likely 2/2 lens changes in relation to hyperglycemia and are expected to resolve, he advised if symptoms persist tomorrow to re-call consult for formal evaluation. Conversation relayed to attending who is in agreement with above. Discussed with patient who is to promptly advise any change in symptoms. Continue to monitor closely.     Candy German PA-C Patient with complaint of blurry vision. Patient states it started last night after drinking Cranberry Juice and has steadily improved but remains a "mild haze". Patient endorses visual changes in both eyes. Patient denies pain, diplopia, "floaters", decreased vision or any other complaints at this time. States she saw a Ophthalmologist 5 months ago with reports of unremarkable exam. Discussed with Dr. Metcalf, opthalmology consulted. Patient's case and events discussed with Dr. Celestin, who advised blurry vision likely 2/2 lens changes in relation to hyperglycemia and are expected to resolve, he advised if symptoms persist tomorrow to re-call consult for formal evaluation. Conversation relayed to attending who is in agreement with above. Discussed with patient who is to promptly advise any change in symptoms. Continue to monitor closely.     Candy German PA-C    Addendum #1: Patient re-assessed, endorses significant improvement in vision, near baseline. No further complaints at this time. Patient status and events to be signed out to night [facility] for continued management and care.

## 2024-08-22 DIAGNOSIS — E78.5 HYPERLIPIDEMIA, UNSPECIFIED: ICD-10-CM

## 2024-08-22 LAB
ALBUMIN SERPL ELPH-MCNC: 4.9 G/DL — SIGNIFICANT CHANGE UP (ref 3.3–5)
ALP SERPL-CCNC: 140 U/L — HIGH (ref 40–120)
ALT FLD-CCNC: 15 U/L — SIGNIFICANT CHANGE UP (ref 10–45)
ANION GAP SERPL CALC-SCNC: 12 MMOL/L — SIGNIFICANT CHANGE UP (ref 5–17)
AST SERPL-CCNC: 11 U/L — SIGNIFICANT CHANGE UP (ref 10–40)
BILIRUB SERPL-MCNC: 0.2 MG/DL — SIGNIFICANT CHANGE UP (ref 0.2–1.2)
BUN SERPL-MCNC: 21 MG/DL — SIGNIFICANT CHANGE UP (ref 7–23)
CALCIUM SERPL-MCNC: 9.5 MG/DL — SIGNIFICANT CHANGE UP (ref 8.4–10.5)
CHLORIDE SERPL-SCNC: 106 MMOL/L — SIGNIFICANT CHANGE UP (ref 96–108)
CO2 SERPL-SCNC: 22 MMOL/L — SIGNIFICANT CHANGE UP (ref 22–31)
CREAT SERPL-MCNC: 0.7 MG/DL — SIGNIFICANT CHANGE UP (ref 0.5–1.3)
EGFR: 104 ML/MIN/1.73M2 — SIGNIFICANT CHANGE UP
GLUCOSE BLDC GLUCOMTR-MCNC: 130 MG/DL — HIGH (ref 70–99)
GLUCOSE BLDC GLUCOMTR-MCNC: 137 MG/DL — HIGH (ref 70–99)
GLUCOSE BLDC GLUCOMTR-MCNC: 162 MG/DL — HIGH (ref 70–99)
GLUCOSE BLDC GLUCOMTR-MCNC: 222 MG/DL — HIGH (ref 70–99)
GLUCOSE BLDC GLUCOMTR-MCNC: 252 MG/DL — HIGH (ref 70–99)
GLUCOSE SERPL-MCNC: 256 MG/DL — HIGH (ref 70–99)
MAGNESIUM SERPL-MCNC: 1.8 MG/DL — SIGNIFICANT CHANGE UP (ref 1.6–2.6)
PHOSPHATE SERPL-MCNC: 3.7 MG/DL — SIGNIFICANT CHANGE UP (ref 2.5–4.5)
POTASSIUM SERPL-MCNC: 3.7 MMOL/L — SIGNIFICANT CHANGE UP (ref 3.5–5.3)
POTASSIUM SERPL-SCNC: 3.7 MMOL/L — SIGNIFICANT CHANGE UP (ref 3.5–5.3)
PROT SERPL-MCNC: 7.2 G/DL — SIGNIFICANT CHANGE UP (ref 6–8.3)
SODIUM SERPL-SCNC: 140 MMOL/L — SIGNIFICANT CHANGE UP (ref 135–145)

## 2024-08-22 PROCEDURE — 99232 SBSQ HOSP IP/OBS MODERATE 35: CPT

## 2024-08-22 PROCEDURE — 99233 SBSQ HOSP IP/OBS HIGH 50: CPT

## 2024-08-22 RX ORDER — L.ACID,PARA/B.BIFIDUM/S.THERM 8B CELL
1 CAPSULE ORAL DAILY
Refills: 0 | Status: DISCONTINUED | OUTPATIENT
Start: 2024-08-22 | End: 2024-08-25

## 2024-08-22 RX ORDER — INSULIN GLARGINE 100 [IU]/ML
36 INJECTION, SOLUTION SUBCUTANEOUS AT BEDTIME
Refills: 0 | Status: DISCONTINUED | OUTPATIENT
Start: 2024-08-22 | End: 2024-08-24

## 2024-08-22 RX ADMIN — Medication 13 UNIT(S): at 08:40

## 2024-08-22 RX ADMIN — Medication 1 TABLET(S): at 12:15

## 2024-08-22 RX ADMIN — ENOXAPARIN SODIUM 40 MILLIGRAM(S): 100 INJECTION SUBCUTANEOUS at 05:29

## 2024-08-22 RX ADMIN — Medication 30 MILLILITER(S): at 08:14

## 2024-08-22 RX ADMIN — Medication 3: at 08:35

## 2024-08-22 RX ADMIN — NEBIVOLOL 10 MILLIGRAM(S): 20 TABLET ORAL at 21:54

## 2024-08-22 RX ADMIN — INSULIN GLARGINE 36 UNIT(S): 100 INJECTION, SOLUTION SUBCUTANEOUS at 21:54

## 2024-08-22 RX ADMIN — Medication 13 UNIT(S): at 17:42

## 2024-08-22 RX ADMIN — Medication 13 UNIT(S): at 12:17

## 2024-08-22 RX ADMIN — Medication 1: at 17:42

## 2024-08-22 RX ADMIN — ENOXAPARIN SODIUM 40 MILLIGRAM(S): 100 INJECTION SUBCUTANEOUS at 18:03

## 2024-08-22 RX ADMIN — AMLODIPINE BESYLATE 2.5 MILLIGRAM(S): 10 TABLET ORAL at 21:54

## 2024-08-22 NOTE — PHARMACOTHERAPY INTERVENTION NOTE - COMMENTS
Educated patient on reason for insulin use (high A1C, meaning of A1C, A1C goal), pathophysiology/ role of insulin in our body, complications of uncontrolled DM and signs of hypoglycemia and what to do in event of hypoglycemia.     Showed patient how to use insulin pen injection for patient using saline demokit- reviewed storage, injection sites, administration steps, and disposal of pen needles. Patient was able to perform teach back and demonstrate proper technique.  Overall, patient feels confident and demonstrates competency in preparing for, and administration of, insulin. Patient knows to rotate injection site. Instruction provided to check blood sugars daily and document readings in a log/diary and follow up with provider for adjustments. Patient understands importance of compliance.    Tari Hauser, PharmD, BCPS  Clinical Pharmacy Specialist  Available on Microsoft Teams (preferred)  Cell: [phone]

## 2024-08-22 NOTE — PROGRESS NOTE ADULT - PROBLEM SELECTOR PLAN 1
Presenting with uncontrolled BG and HHS  - A1c 12.6  - endocrine following, on  Lantus 33u  at bedtime, Lispro 13 units TIDAC  - Discharge med's:  basal insulin dosing TBD + metformin  mg BID with meals + ozempic 0.25 mg weekly  - CC diet  - ISS qac and qhs  - Endo f/u for d/c medication regimen  - Biabetic education, nutrition eval Presenting with uncontrolled BG and HHS  - A1c 12.6  - endocrine following, on Lantus 33u  at bedtime, Lispro 13 units TIDAC  - Discharge med's:  basal insulin dosing TBD + metformin  mg BID with meals + ozempic 0.25 mg weekly  - CC diet  - ISS qac and qhs  - Endo f/u for d/c medication regimen  - Diabetic education, nutrition eval

## 2024-08-22 NOTE — PROGRESS NOTE ADULT - SUBJECTIVE AND OBJECTIVE BOX
Lino Metcalf MD  Division of Hospital Medicine  Available via MS teams  ---------------------------------------------------------    WALDEMAR DUNCAN  53y  Female      Patient is a 53y old  Female who presents with a chief complaint of HHS (21 Aug 2024 21:23)      INTERVAL HPI/OVERNIGHT EVENTS:  INCOMPLETE      REVIEW OF SYSTEMS: 10 point ROS negative unless listed above    T(C): 36.8 (08-22-24 @ 06:02), Max: 37.1 (08-22-24 @ 00:28)  HR: 66 (08-22-24 @ 06:02) (54 - 75)  BP: 128/86 (08-22-24 @ 06:02) (128/86 - 153/86)  RR: 18 (08-22-24 @ 06:02) (18 - 18)  SpO2: 97% (08-22-24 @ 06:02) (96% - 98%)  Wt(kg): --Vital Signs Last 24 Hrs  T(C): 36.8 (22 Aug 2024 06:02), Max: 37.1 (22 Aug 2024 00:28)  T(F): 98.2 (22 Aug 2024 06:02), Max: 98.8 (22 Aug 2024 00:28)  HR: 66 (22 Aug 2024 06:02) (54 - 75)  BP: 128/86 (22 Aug 2024 06:02) (128/86 - 153/86)  BP(mean): --  RR: 18 (22 Aug 2024 06:02) (18 - 18)  SpO2: 97% (22 Aug 2024 06:02) (96% - 98%)    Parameters below as of 22 Aug 2024 06:02  Patient On (Oxygen Delivery Method): room air        PHYSICAL EXAM:  GENERAL: NAD, well-groomed, well-developed  CHEST/LUNG: Clear to auscultation bilaterally; No rales, rhonchi, wheezing, or rubs  HEART: Regular rate and rhythm; No murmurs, rubs, or gallops  ABDOMEN: Soft, Nontender, Nondistended; Bowel sounds present.    PSYCH: Alert & Oriented x3        LABS:                        13.3   7.61  )-----------( 205      ( 21 Aug 2024 08:14 )             42.0     08-21    140  |  103  |  16  ----------------------------<  307<H>  4.3   |  24  |  0.83    Ca    9.5      21 Aug 2024 08:14  Phos  3.0     08-21  Mg     1.8     08-21    TPro  7.3  /  Alb  4.0  /  TBili  0.5  /  DBili  x   /  AST  13  /  ALT  16  /  AlkPhos  147<H>  08-21      Urinalysis Basic - ( 21 Aug 2024 08:14 )    Color: x / Appearance: x / SG: x / pH: x  Gluc: 307 mg/dL / Ketone: x  / Bili: x / Urobili: x   Blood: x / Protein: x / Nitrite: x   Leuk Esterase: x / RBC: x / WBC x   Sq Epi: x / Non Sq Epi: x / Bacteria: x      CAPILLARY BLOOD GLUCOSE      POCT Blood Glucose.: 216 mg/dL (21 Aug 2024 21:45)  POCT Blood Glucose.: 237 mg/dL (21 Aug 2024 17:10)  POCT Blood Glucose.: 216 mg/dL (21 Aug 2024 12:16)  POCT Blood Glucose.: 286 mg/dL (21 Aug 2024 08:15)        Urinalysis Basic - ( 21 Aug 2024 08:14 )    Color: x / Appearance: x / SG: x / pH: x  Gluc: 307 mg/dL / Ketone: x  / Bili: x / Urobili: x   Blood: x / Protein: x / Nitrite: x   Leuk Esterase: x / RBC: x / WBC x   Sq Epi: x / Non Sq Epi: x / Bacteria: x        RADIOLOGY & ADDITIONAL TESTS:    Imaging Personally Reviewed:  [ ] YES  [ ] NO Lino Metcalf MD  Division of Hospital Medicine  Available via MS teams  ---------------------------------------------------------    WALDEMAR DUNCAN  53y  Female      Patient is a 53y old  Female who presents with a chief complaint of HHS (21 Aug 2024 21:23)      INTERVAL HPI/OVERNIGHT EVENTS:  Seen at bedside. Saw nutritionist yesterday, feeling better.       REVIEW OF SYSTEMS: 10 point ROS negative unless listed above    T(C): 36.8 (08-22-24 @ 06:02), Max: 37.1 (08-22-24 @ 00:28)  HR: 66 (08-22-24 @ 06:02) (54 - 75)  BP: 128/86 (08-22-24 @ 06:02) (128/86 - 153/86)  RR: 18 (08-22-24 @ 06:02) (18 - 18)  SpO2: 97% (08-22-24 @ 06:02) (96% - 98%)  Wt(kg): --Vital Signs Last 24 Hrs  T(C): 36.8 (22 Aug 2024 06:02), Max: 37.1 (22 Aug 2024 00:28)  T(F): 98.2 (22 Aug 2024 06:02), Max: 98.8 (22 Aug 2024 00:28)  HR: 66 (22 Aug 2024 06:02) (54 - 75)  BP: 128/86 (22 Aug 2024 06:02) (128/86 - 153/86)  BP(mean): --  RR: 18 (22 Aug 2024 06:02) (18 - 18)  SpO2: 97% (22 Aug 2024 06:02) (96% - 98%)    Parameters below as of 22 Aug 2024 06:02  Patient On (Oxygen Delivery Method): room air        PHYSICAL EXAM:  GENERAL: NAD, well-groomed, well-developed  CHEST/LUNG: Clear to auscultation bilaterally; No rales, rhonchi, wheezing, or rubs  HEART: Regular rate and rhythm; No murmurs, rubs, or gallops  ABDOMEN: Soft, Nontender, Nondistended; Bowel sounds present.    PSYCH: Alert & Oriented x3        LABS:                        13.3   7.61  )-----------( 205      ( 21 Aug 2024 08:14 )             42.0     08-21    140  |  103  |  16  ----------------------------<  307<H>  4.3   |  24  |  0.83    Ca    9.5      21 Aug 2024 08:14  Phos  3.0     08-21  Mg     1.8     08-21    TPro  7.3  /  Alb  4.0  /  TBili  0.5  /  DBili  x   /  AST  13  /  ALT  16  /  AlkPhos  147<H>  08-21      Urinalysis Basic - ( 21 Aug 2024 08:14 )    Color: x / Appearance: x / SG: x / pH: x  Gluc: 307 mg/dL / Ketone: x  / Bili: x / Urobili: x   Blood: x / Protein: x / Nitrite: x   Leuk Esterase: x / RBC: x / WBC x   Sq Epi: x / Non Sq Epi: x / Bacteria: x      CAPILLARY BLOOD GLUCOSE      POCT Blood Glucose.: 216 mg/dL (21 Aug 2024 21:45)  POCT Blood Glucose.: 237 mg/dL (21 Aug 2024 17:10)  POCT Blood Glucose.: 216 mg/dL (21 Aug 2024 12:16)  POCT Blood Glucose.: 286 mg/dL (21 Aug 2024 08:15)        Urinalysis Basic - ( 21 Aug 2024 08:14 )    Color: x / Appearance: x / SG: x / pH: x  Gluc: 307 mg/dL / Ketone: x  / Bili: x / Urobili: x   Blood: x / Protein: x / Nitrite: x   Leuk Esterase: x / RBC: x / WBC x   Sq Epi: x / Non Sq Epi: x / Bacteria: x        RADIOLOGY & ADDITIONAL TESTS:    Imaging Personally Reviewed:  [ ] YES  [ ] NO

## 2024-08-22 NOTE — PROGRESS NOTE ADULT - NSPROGADDITIONALINFOA_GEN_ALL_CORE
Thank you for the consult. Will continue to monitor. Please inform the endocrinology team at least 24 hours prior to intended discharge date.     Please note a different endocrinology physician will be following the patient tomorrow.    Contact via pager or [employer] Teams during business hours. For follow up questions, discharge recommendations, or new consults please call answering service at [phone] (weekdays), [phone] (nights/weekends). For nonurgent matters, please email  [email or web address].     Fabiola Maya D.O.  Attending Physician   Department of Endocrinology, Diabetes and Metabolism   [employer] Teams     For urgent matters before 9AM or after 5PM, or on weekends/holidays, please page the on call endocrine fellow.   For nonurgent matters, please email [email or web address] for assistance.

## 2024-08-22 NOTE — PROGRESS NOTE ADULT - SUBJECTIVE AND OBJECTIVE BOX
Subjective and Objective:    INTERVAL HPI/OVERNIGHT EVENTS:  Seen at the bedside. Patient is upset and tearful. She notes she was awakened at 5 AM by neighboring patient in her room and was upset to overhear a bedside procedure today. She is feeling increased stress and reports she has not been able to rest for the past 3 days. She received 13 units humalog for lunch however she did not feel like eating anything, advised nurse to give the patient juice right away    She reports she will follow up with her outpatient endocrinologist on discharge- Dr. Georges and has appt upcoming next Thursday    Review of systems:   CONSTITUTIONAL:  feels well  CARDIOVASCULAR:  Negative for chest pain or palpitations  RESPIRATORY:  Negative for cough, or SOB   GASTROINTESTINAL:  Negative for nausea, vomiting, or abdominal pain  GENITOURINARY:  Negative frequency, urgency or dysuria     CAPILLARY BLOOD GLUCOSE    POCT Blood Glucose.: 137 mg/dL (22 Aug 2024 14:14)  POCT Blood Glucose.: 130 mg/dL (22 Aug 2024 12:09)  POCT Blood Glucose.: 252 mg/dL (22 Aug 2024 08:05)  POCT Blood Glucose.: 216 mg/dL (21 Aug 2024 21:45)  POCT Blood Glucose.: 237 mg/dL (21 Aug 2024 17:10)       MEDICATIONS  (STANDING):  amLODIPine   Tablet 2.5 milliGRAM(s) Oral daily  atorvastatin 20 milliGRAM(s) Oral at bedtime  dextrose 5%. 1000 milliLiter(s) (50 mL/Hr) IV Continuous <Continuous>  dextrose 5%. 1000 milliLiter(s) (100 mL/Hr) IV Continuous <Continuous>  dextrose 50% Injectable 25 Gram(s) IV Push once  dextrose 50% Injectable 12.5 Gram(s) IV Push once  dextrose 50% Injectable 25 Gram(s) IV Push once  enoxaparin Injectable 40 milliGRAM(s) SubCutaneous every 12 hours  glucagon  Injectable 1 milliGRAM(s) IntraMuscular once  insulin glargine Injectable (LANTUS) 33 Unit(s) SubCutaneous at bedtime  insulin lispro (ADMELOG) corrective regimen sliding scale   SubCutaneous at bedtime  insulin lispro (ADMELOG) corrective regimen sliding scale   SubCutaneous three times a day before meals  insulin lispro Injectable (ADMELOG) 13 Unit(s) SubCutaneous three times a day before meals  lactobacillus acidophilus 1 Tablet(s) Oral daily  multivitamin 1 Tablet(s) Oral daily  nebivolol 10 milliGRAM(s) Oral daily    MEDICATIONS  (PRN):  acetaminophen     Tablet .. 650 milliGRAM(s) Oral every 6 hours PRN Temp greater or equal to 38C (100.4F), Mild Pain (1 - 3)  aluminum hydroxide/magnesium hydroxide/simethicone Suspension 30 milliLiter(s) Oral every 4 hours PRN Dyspepsia  dextrose Oral Gel 15 Gram(s) Oral once PRN Blood Glucose LESS THAN 70 milliGRAM(s)/deciliter  melatonin 3 milliGRAM(s) Oral at bedtime PRN Insomnia  ondansetron Injectable 4 milliGRAM(s) IV Push every 8 hours PRN Nausea and/or Vomiting      PHYSICAL EXAM  Vital Signs Last 24 Hrs  T(C): 36.8 (22 Aug 2024 12:46), Max: 37.1 (22 Aug 2024 00:28)  T(F): 98.2 (22 Aug 2024 12:46), Max: 98.8 (22 Aug 2024 00:28)  HR: 66 (22 Aug 2024 12:46) (61 - 75)  BP: 122/86 (22 Aug 2024 12:46) (122/86 - 153/86)  BP(mean): --  RR: 18 (22 Aug 2024 12:46) (18 - 18)  SpO2: 99% (22 Aug 2024 12:46) (96% - 99%)    Parameters below as of 22 Aug 2024 12:46  Patient On (Oxygen Delivery Method): room air    GENERAL: feMale sitting in chair, tearful and upset  RESPIRATORY: nonlabored breathing, no accessory muscle use  Extremities: Warm, no edema in all 4 exts   NEURO: A&O X3    LABS:                        13.3   7.61  )-----------( 205      ( 21 Aug 2024 08:14 )             42.0     08-22    140  |  106  |  21  ----------------------------<  256<H>  3.7   |  22  |  0.70    Ca    9.5      22 Aug 2024 09:46  Phos  3.7     08-22  Mg     1.8     08-22    TPro  7.2  /  Alb  4.9  /  TBili  0.2  /  DBili  x   /  AST  11  /  ALT  15  /  AlkPhos  140<H>  08-22      Urinalysis Basic - ( 22 Aug 2024 09:46 )    Color: x / Appearance: x / SG: x / pH: x  Gluc: 256 mg/dL / Ketone: x  / Bili: x / Urobili: x   Blood: x / Protein: x / Nitrite: x   Leuk Esterase: x / RBC: x / WBC x   Sq Epi: x / Non Sq Epi: x / Bacteria: x

## 2024-08-22 NOTE — PROGRESS NOTE ADULT - ASSESSMENT
53 F with history of prediabetes, RCC s/p L nephrectomy here for polyuria. Endocrinology consulted for diabetes management. Patient is high risk with high level decision making due to uncontrolled diabetes with A1C>10 which places patient at high risk for cardiovascular and cerebrovascular events. Patient with lability of glucose requiring close monitoring and insulin adjustments.    # T2DM  with hyperglycemia   - Most recent Hemoglobin A1C 12.6  - Current FS ranges from 130-252  - Current diet: Carb consistent diet   - Please monitor blood glucose values TID AC & QHS while eating regular meals and Q6H while NPO  - Blood glucose goals pre-meal less than 140 mg/dL and random blood glucose less than 180 mg/dL  - Recommendations:  - Fasting glucose above goal, increase insulin Glargine to 36 units QHS  - Continue insulin Lispro to 13 units TID with meals, hold if NPO or if eating less than 50% of meals  - Continue with low dose correctional scale TID with meals  - Continue with low dose correctional scale QHS    Discharge planning:   - Home DM medications: none, lifestyle modification only   - Discharge DM medications: basal insulin + metformin  mg BID with meals + ozempic 0.25 mg weekly (please send prescription for ozempic 0.25 mg weekly.     - Patient will follow up  her outpatient endocrinologist Dr. Raquel Georges outpatient, she has appt scheduled for next Thursday    - patient will need Insulin teaching  - Please ensure patient has the following diabetes supplies:  - Glucometer (ACCU_CHECK yas Connect, Ascensia Contour Next EZ or One, Freestyle Freedome LITE or OneTouch Verio IQ)  - Glucometer test strips and lancets  (make sure compatible w/ glucometer), Dispense #100 (or #200) use as directed  - Lantus Solostar Pen (or Basaglar Kwikpen) __TBD__ units before bedtime (dose TBD)  - BD tiffany 4mm pen needles.   - Please verify with pharmacy that each script is covered before discharge.  - Patient will need opthalmology and podiatry follow up as outpatient     # HTN  - BP goal 130/80  - on amlodipine and nebivolol  - Management per primary team     # HLD  -   -  Continue atorvastatin 20 mg QHS (started this admission)  - Repeat lipid panel outpatient  - Goal LDL<70    # Class 3 obesity   - BMI 44.1   - Outpatient start GLP1RA as above

## 2024-08-23 ENCOUNTER — TRANSCRIPTION ENCOUNTER (OUTPATIENT)
Age: 54
End: 2024-08-23

## 2024-08-23 DIAGNOSIS — E11.65 TYPE 2 DIABETES MELLITUS WITH HYPERGLYCEMIA: ICD-10-CM

## 2024-08-23 LAB
GLUCOSE BLDC GLUCOMTR-MCNC: 124 MG/DL — HIGH (ref 70–99)
GLUCOSE BLDC GLUCOMTR-MCNC: 133 MG/DL — HIGH (ref 70–99)
GLUCOSE BLDC GLUCOMTR-MCNC: 158 MG/DL — HIGH (ref 70–99)
GLUCOSE BLDC GLUCOMTR-MCNC: 191 MG/DL — HIGH (ref 70–99)
GLUCOSE BLDC GLUCOMTR-MCNC: 191 MG/DL — HIGH (ref 70–99)

## 2024-08-23 PROCEDURE — 99232 SBSQ HOSP IP/OBS MODERATE 35: CPT

## 2024-08-23 PROCEDURE — 99233 SBSQ HOSP IP/OBS HIGH 50: CPT

## 2024-08-23 RX ADMIN — Medication 1 TABLET(S): at 13:06

## 2024-08-23 RX ADMIN — Medication 13 UNIT(S): at 10:05

## 2024-08-23 RX ADMIN — Medication 13 UNIT(S): at 13:05

## 2024-08-23 RX ADMIN — NEBIVOLOL 10 MILLIGRAM(S): 20 TABLET ORAL at 22:25

## 2024-08-23 RX ADMIN — Medication 1: at 10:05

## 2024-08-23 RX ADMIN — AMLODIPINE BESYLATE 2.5 MILLIGRAM(S): 10 TABLET ORAL at 22:25

## 2024-08-23 RX ADMIN — ENOXAPARIN SODIUM 40 MILLIGRAM(S): 100 INJECTION SUBCUTANEOUS at 18:01

## 2024-08-23 RX ADMIN — Medication 13 UNIT(S): at 17:56

## 2024-08-23 RX ADMIN — INSULIN GLARGINE 36 UNIT(S): 100 INJECTION, SOLUTION SUBCUTANEOUS at 22:26

## 2024-08-23 RX ADMIN — Medication 20 MILLIGRAM(S): at 22:26

## 2024-08-23 RX ADMIN — ENOXAPARIN SODIUM 40 MILLIGRAM(S): 100 INJECTION SUBCUTANEOUS at 06:30

## 2024-08-23 NOTE — DISCHARGE NOTE NURSING/CASE MANAGEMENT/SOCIAL WORK - NSDCPEFALRISK_GEN_ALL_CORE
For information on Fall & Injury Prevention, visit: [email or web address] OR  [email or web address] OR  https://www.cdc.gov/steadi/patient.html yes

## 2024-08-23 NOTE — DISCHARGE NOTE PROVIDER - DETAILS OF MALNUTRITION DIAGNOSIS/DIAGNOSES
This patient has been assessed with a concern for Malnutrition and was treated during this hospitalization for the following Nutrition diagnosis/diagnoses:     -  08/21/2024: Morbid obesity (BMI > 40)

## 2024-08-23 NOTE — DISCHARGE NOTE NURSING/CASE MANAGEMENT/SOCIAL WORK - PATIENT PORTAL LINK FT
You can access the FollowMyHealth Patient Portal offered by [facility] by registering at the following website: [email or web address]. By joining [facility]’s FollowMyHealth portal, you will also be able to view your health information using other applications (apps) compatible with our system.

## 2024-08-23 NOTE — DISCHARGE NOTE PROVIDER - NSDCMRMEDTOKEN_GEN_ALL_CORE_FT
Bystolic 10 mg oral tablet: 1 tab(s) orally once a day  FaBB oral tablet: 1 tab(s) orally once a day  magnesium, collagen, turmeric, vitamin C, folic acid:   Norvasc 2.5 mg oral tablet: 1 tab(s) orally once a day  Slow Fe:    Admelog 100 units/mL injectable solution: 12 unit(s) injectable 3 times a day (before meals) unit(s) subcutaneous 3 times a day (with meals)  alcohol swabs: Apply topically to affected area 4 times a day  atorvastatin 20 mg oral tablet: 1 tab(s) orally once a day (at bedtime)  Bystolic 10 mg oral tablet: 1 tab(s) orally once a day  FaBB oral tablet: 1 tab(s) orally once a day  Freestyle Afshan 3 Sensors: Please change every 14 days  glucometer (per patient&#x27;s insurance): Test blood sugars four times a day. Dispense #1 glucometer.  Insulin Pen Needles, 4mm: 1 application subcutaneously 4 times a day. ** Use with insulin pen **  lancets: 1 application subcutaneously 4 times a day  Lantus Solostar Pen 100 units/mL subcutaneous solution: 36 unit(s) subcutaneous once a day (at bedtime) unit(s) subcutaneous once a day  magnesium, collagen, turmeric, vitamin C, folic acid:   metFORMIN 500 mg oral tablet: 1 tab(s) orally 2 times a day (with meals) for one week. If tolerating on day 8, increase to two tablets (1000 mG) twice a day (with meals).  Norvasc 2.5 mg oral tablet: 1 tab(s) orally once a day  Slow Fe:   test strips (per patient&#x27;s insurance): 1 application subcutaneously 4 times a day. ** Compatible with patient&#x27;s glucometer **   Admelog 100 units/mL injectable solution: 12 unit(s) injectable 3 times a day (before meals) unit(s) subcutaneous 3 times a day (with meals)  alcohol swabs: Apply topically to affected area 4 times a day  atorvastatin 20 mg oral tablet: 1 tab(s) orally once a day (at bedtime)  Bystolic 10 mg oral tablet: 1 tab(s) orally once a day  FaBB oral tablet: 1 tab(s) orally once a day  Freestyle Afshan 3 Sensors: Please change every 14 days  glucometer (per patient&#x27;s insurance): Test blood sugars four times a day. Dispense #1 glucometer.  Insulin Pen Needles, 4mm: 1 application subcutaneously 4 times a day. ** Use with insulin pen **  lancets: 1 application subcutaneously 4 times a day  Lantus Solostar Pen 100 units/mL subcutaneous solution: 40 unit(s) subcutaneous once a day (at bedtime) unit(s) subcutaneous once a day  magnesium, collagen, turmeric, vitamin C, folic acid:   metFORMIN 500 mg oral tablet: 1 tab(s) orally 2 times a day (with meals) for one week. If tolerating on day 8, increase to two tablets (1000 mG) twice a day (with meals).  Norvasc 2.5 mg oral tablet: 1 tab(s) orally once a day  Slow Fe:   test strips (per patient&#x27;s insurance): 1 application subcutaneously 4 times a day. ** Compatible with patient&#x27;s glucometer **   Admelog 100 units/mL injectable solution: 12 unit(s) injectable 3 times a day (before meals) unit(s) subcutaneous 3 times a day (with meals)  atorvastatin 20 mg oral tablet: 1 tab(s) orally once a day (at bedtime)  Bystolic 10 mg oral tablet: 1 tab(s) orally once a day  FaBB oral tablet: 1 tab(s) orally once a day  Lantus Solostar Pen 100 units/mL subcutaneous solution: 40 unit(s) subcutaneous once a day (at bedtime) unit(s) subcutaneous once a day  magnesium, collagen, turmeric, vitamin C, folic acid:   metFORMIN 500 mg oral tablet: 1 tab(s) orally 2 times a day (with meals) for one week. If tolerating on day 8, increase to two tablets (1000 mG) twice a day (with meals).  Norvasc 2.5 mg oral tablet: 1 tab(s) orally once a day  Slow Fe:

## 2024-08-23 NOTE — PROGRESS NOTE ADULT - ASSESSMENT
53 F with history of prediabetes, RCC s/p L nephrectomy here for polyuria. Endocrinology consulted for diabetes management. Patient is high risk with high level decision making due to uncontrolled diabetes with A1C>12 which places patient at high risk for cardiovascular and cerebrovascular events. Patient with lability of glucose requiring close monitoring and insulin adjustments.    # T2DM  with hyperglycemia   - Most recent Hemoglobin A1C 12.6  Recommendations:  - Current diet: Carb consistent diet   - Please monitor blood glucose values TID AC & QHS while eating regular meals and Q6H while NPO  - Blood glucose goals pre-meal less than 140 mg/dL and random blood glucose less than 180 mg/dL  - Fasting glucose improved but above goal, increase insulin Glargine to 40 units QHS  - Continue insulin Lispro to 13 units TID with meals, hold if NPO or if eating less than 50% of meals  - Continue with low dose correctional scale TID with meals  - Continue with low dose correctional scale QHS    Discharge planning:   - Home DM medications: none, lifestyle modification only   - Discharge DM medications: basal insulin 36 units at bedtime + metformin  mg BID with meals (If tolerating after 1 week, increase to 2 tabs twice a day with meals) + ozempic 0.25 mg weekly x 4 weeks, then increase to 0.5mg sc weekly.   - Patient will follow up  her outpatient endocrinologist Dr. Raquel Georges outpatient, she has appt scheduled for next Thursday    - patient will need Insulin teaching- reports this was completed with RN   - Please ensure patient has the following diabetes supplies:  - Glucometer (ACCU_CHECK yas Connect, Ascensia Contour Next EZ or One, Freestyle Freedome LITE or OneTouch Verio IQ)  - Glucometer test strips and lancets  (make sure compatible w/ glucometer), Dispense #100 (or #200) use as directed  - Lantus Solostar Pen (or Basaglar Kwikpen) 36 units before bedtime   - BD tiffany 4mm pen needles.   - Please verify with pharmacy that each script is covered before discharge.  - Patient will need opthalmology and podiatry follow up as outpatient   - Patient would like a prescription for continuous glucose monitor - Please send Rx for Freestyle Afshan 3 CGMs to patient's pharmacy. Will place CGM sample prior to discharge.   - Outpatient BG targets: Fasting 80-130mg/dL, Premeal < 140mg/dL, 2 hours Postprandial < 180mg/dL and Hypoglycemia < 70mg/dL.   - Follow up with Outpatient PCP/Endo with Highs and Lows.     # HTN  - BP goal 130/80  - on amlodipine and nebivolol  - Management per primary team   - outpatient UACR     # HLD  -   -  Continue atorvastatin 20 mg QHS (started this admission)  - Repeat fasting lipid panel outpatient  - Goal LDL<70    # Class 3 obesity   - BMI 44.1   - Outpatient start GLP1RA as above       Lisa Arenas DO   Attending Physician   Department of Endocrinology, Diabetes and Metabolism     If before 9AM or after 5PM, or on weekends/holidays, please call the Endocrine answering service for assistance ([phone]).  For nonurgent matters, please email [email or web address] or [email or web address]     Please note that this patient may be followed by different provider tomorrow.  Notify endocrine 24 hours prior to discharge for final recommendations 53 F with history of prediabetes, RCC s/p L nephrectomy here for polyuria. Endocrinology consulted for diabetes management. Patient is high risk with high level decision making due to uncontrolled diabetes with A1C>12 which places patient at high risk for cardiovascular and cerebrovascular events. Patient with lability of glucose requiring close monitoring and insulin adjustments.    # T2DM  with hyperglycemia   - Most recent Hemoglobin A1C 12.6  Recommendations:  - Current diet: Carb consistent diet   - Please monitor blood glucose values TID AC & QHS while eating regular meals and Q6H while NPO  - Blood glucose goals pre-meal less than 140 mg/dL and random blood glucose less than 180 mg/dL  - Fasting glucose improved but above goal, increase insulin Glargine to 40 units QHS  - Continue insulin Lispro to 13 units TID with meals, hold if NPO or if eating less than 50% of meals  - Continue with low dose correctional scale TID with meals  - Continue with low dose correctional scale QHS    Discharge planning:   - Home DM medications: none, lifestyle modification only   - Patient does not wish to be on GLP1A due to prior adverse reaction that a family member had with GLP1A.   - Discharge DM medications: basal insulin 36 units at bedtime + bolus insulin pen 12 units TIDAC + metformin  mg BID with meals (If tolerating after 1 week, increase to 2 tabs twice a day with meals).   - Patient will follow up  her outpatient endocrinologist Dr. Raquel Georges outpatient, she has appt scheduled for next Thursday    - patient will need Insulin teaching- reports this was completed with RN   - Please ensure patient has the following diabetes supplies:  - Glucometer (ACCU_CHECK yas Connect, Ascensia Contour Next EZ or One, Freestyle Freedome LITE or OneTouch Verio IQ)  - Glucometer test strips and lancets  (make sure compatible w/ glucometer), Dispense #100 (or #200) use as directed  - Lantus Solostar Pen (or Basaglar Kwikpen) 36 units before bedtime   - Bolus insulin pen (ie. Admelog, Humalog, Or Novolog) 12 units before meals three times per day.   - BD tiffany 4mm pen needles.   - Please verify with pharmacy that each script is covered before discharge.  - Patient will need opthalmology and podiatry follow up as outpatient   - Patient would like a prescription for continuous glucose monitor - Please send Rx for Freestyle Afshan 3 CGMs to patient's pharmacy. Will place CGM sample prior to discharge.   - Outpatient BG targets: Fasting 80-130mg/dL, Premeal < 140mg/dL, 2 hours Postprandial < 180mg/dL and Hypoglycemia < 70mg/dL.   - Follow up with Outpatient PCP/Endo with Highs and Lows.     # HTN  - BP goal 130/80  - on amlodipine and nebivolol  - Management per primary team   - outpatient UACR     # HLD  -   -  Continue atorvastatin 20 mg QHS (started this admission)  - Repeat fasting lipid panel outpatient  - Goal LDL<70    # Class 3 obesity   - BMI 44.1   - Outpatient start GLP1RA as above       Lisa Arenas DO   Attending Physician   Department of Endocrinology, Diabetes and Metabolism     If before 9AM or after 5PM, or on weekends/holidays, please call the Endocrine answering service for assistance ([phone]).  For nonurgent matters, please email [email or web address] or [email or web address]     Please note that this patient may be followed by different provider tomorrow.  Notify endocrine 24 hours prior to discharge for final recommendations

## 2024-08-23 NOTE — PROGRESS NOTE ADULT - PROBLEM SELECTOR PLAN 1
Presenting with uncontrolled BG and HHS  - A1c 12.6  - endocrine following, on Lantus 63u at bedtime, Lispro 13 units TIDAC  - Discharge med's:  basal insulin dosing TBD + metformin  mg BID with meals + ozempic 0.25 mg weekly  - CC diet  - ISS qac and qhs  - Endo f/u for d/c medication regimen  - Diabetic education, nutrition eval Presenting with uncontrolled BG and HHS  - A1c 12.6  - endocrine following, on Lantus 36u at bedtime, Lispro 13 units TIDAC  - Discharge med's:  basal insulin 36 units at bedtime + metformin  mg BID with meals (If tolerating after 1 week, increase to 2 tabs twice a day with meals) + ozempic 0.25 mg weekly x 4 weeks, then increase to 0.5mg sc weekly  - CC diet  - ISS qac and qhs  - Endo f/u for d/c medication regimen  - Diabetic education, nutrition eval

## 2024-08-23 NOTE — PROGRESS NOTE ADULT - PROBLEM SELECTOR PLAN 5
DVT ppx - Lovenox  Diet - CC  Dispo - pending recs from endocrine for DM management  Code status - full code DVT ppx - Lovenox  Diet - CC  Dispo - d/c planning  Code status - full code    Stable for d/c should pt have received adequate diabetes teaching and comfortable with injecting insulin    d/c time 36 minutes

## 2024-08-23 NOTE — DISCHARGE NOTE PROVIDER - NSDCCPCAREPLAN_GEN_ALL_CORE_FT
PRINCIPAL DISCHARGE DIAGNOSIS  Diagnosis: Uncontrolled type 2 diabetes mellitus with hyperglycemia  Assessment and Plan of Treatment: - Discharge DM medications: basal insulin 36 units at bedtime + bolus insulin pen 12 units TIDAC + metformin  mg BID with meals (If tolerating after 1 week, increase to 2 tabs twice a day with meals).   - Patient will follow up  her outpatient endocrinologist Dr. Raquel Georges outpatient, she has appt scheduled for next Thursday     PRINCIPAL DISCHARGE DIAGNOSIS  Diagnosis: Uncontrolled type 2 diabetes mellitus with hyperglycemia  Assessment and Plan of Treatment: Frequent urination and excessive thirst was due to elevated blood sugar.    Discharge DM medications: basal insulin 36 units at bedtime + bolus insulin pen 12 units before meals and bedtime + metformin  mg twice daily with meals (If tolerating after 1 week, increase to 2 tabs twice a day with meals).   - You have an appointment with your  outpatient endocrinologist Dr. Georges next Thursday.   HgA1C this [id number].  Make sure you get your HgA1c checked every three months.  If you take oral diabetes medications, check your blood glucose two times a day.  If you take insulin, check your blood glucose before meals and at bedtime.  It's important not to skip any meals.  Keep a log of your blood glucose results and always take it with you to your doctor appointments.  Keep a list of your current medications including injectables and over the counter medications and bring this medication list with you to all your doctor appointments.  If you have not seen your ophthalmologist this year call for appointment.  Check your feet daily for redness, sores, or openings. Do not self treat. If no improvement in two days call your primary care physician for an appointment.  Low blood sugar (hypoglycemia) is a blood sugar below 70mg/dl. Check your blood sugar if you feel signs/symptoms of hypoglycemia. If your blood sugar is below 70 take 15 grams of carbohydrates (ex 4 oz of apple juice, 3-4 glucose tablets, or 4-6 oz of regular soda) wait 15 minutes and repeat blood sugar to make sure it comes up above 70.  If your blood sugar is above 70 and you are due for a meal, have a meal.  If you are not due for a meal have a snack.  This snack helps keeps your blood sugar at a safe range.       PRINCIPAL DISCHARGE DIAGNOSIS  Diagnosis: Uncontrolled type 2 diabetes mellitus with hyperglycemia  Assessment and Plan of Treatment: Frequent urination and excessive thirst was due to elevated blood sugar.    Discharge DM medications: basal insulin 40 units at bedtime + bolus insulin pen 12 units before meals and bedtime + metformin  mg twice daily with meals (If tolerating after 1 week, increase to 2 tabs twice a day with meals).   - You have an appointment with your  outpatient endocrinologist Dr. Georges next Thursday.   HgA1C this [id number].  Make sure you get your HgA1c checked every three months.  If you take oral diabetes medications, check your blood glucose two times a day.  If you take insulin, check your blood glucose before meals and at bedtime.  It's important not to skip any meals.  Keep a log of your blood glucose results and always take it with you to your doctor appointments.  Keep a list of your current medications including injectables and over the counter medications and bring this medication list with you to all your doctor appointments.  If you have not seen your ophthalmologist this year call for appointment.  Check your feet daily for redness, sores, or openings. Do not self treat. If no improvement in two days call your primary care physician for an appointment.  Low blood sugar (hypoglycemia) is a blood sugar below 70mg/dl. Check your blood sugar if you feel signs/symptoms of hypoglycemia. If your blood sugar is below 70 take 15 grams of carbohydrates (ex 4 oz of apple juice, 3-4 glucose tablets, or 4-6 oz of regular soda) wait 15 minutes and repeat blood sugar to make sure it comes up above 70.  If your blood sugar is above 70 and you are due for a meal, have a meal.  If you are not due for a meal have a snack.  This snack helps keeps your blood sugar at a safe range.

## 2024-08-23 NOTE — PHYSICAL THERAPY INITIAL EVALUATION ADULT - ADDITIONAL COMMENTS
pt reports living in a  with 13 ANNIE with handrail and 13 steps inside to bedroom with her . She was fully ind with all ADLs (+) . Pt reports that she does not work due to prior disabilities. She reports owning a cane and rw, but does not use them.

## 2024-08-23 NOTE — DISCHARGE NOTE PROVIDER - NSDCFUADDAPPT_GEN_ALL_CORE_FT
APPTS ARE READY TO BE MADE: [ x] YES    Best Family or Patient Contact (if needed):    Additional Information about above appointments (if needed):    1:   2:   3:     Other comments or requests:    APPTS ARE READY TO BE MADE: [ x] YES    Best Family or Patient Contact (if needed):    Additional Information about above appointments (if needed):    1:   2:   3:     Other comments or requests:   Patient informed us they already have secured a follow up appointment which is not visible on [employer].

## 2024-08-23 NOTE — PROGRESS NOTE ADULT - SUBJECTIVE AND OBJECTIVE BOX
Lino Metcalf MD  Division of Hospital Medicine  Available via MS teams  ---------------------------------------------------------    WALDEMAR DUNCAN  53y  Female      Patient is a 53y old  Female who presents with a chief complaint of HHS (22 Aug 2024 14:15)      INTERVAL HPI/OVERNIGHT EVENTS:  INCOMPLETE      REVIEW OF SYSTEMS: 10 point ROS negative unless listed above    T(C): 36.4 (08-23-24 @ 04:53), Max: 36.8 (08-22-24 @ 12:46)  HR: 53 (08-23-24 @ 04:53) (53 - 73)  BP: 128/85 (08-23-24 @ 04:53) (122/86 - 142/90)  RR: 18 (08-23-24 @ 04:53) (18 - 18)  SpO2: 98% (08-23-24 @ 04:53) (98% - 99%)  Wt(kg): --Vital Signs Last 24 Hrs  T(C): 36.4 (23 Aug 2024 04:53), Max: 36.8 (22 Aug 2024 12:46)  T(F): 97.6 (23 Aug 2024 04:53), Max: 98.3 (22 Aug 2024 21:30)  HR: 53 (23 Aug 2024 04:53) (53 - 73)  BP: 128/85 (23 Aug 2024 04:53) (122/86 - 142/90)  BP(mean): --  RR: 18 (23 Aug 2024 04:53) (18 - 18)  SpO2: 98% (23 Aug 2024 04:53) (98% - 99%)    Parameters below as of 23 Aug 2024 04:53  Patient On (Oxygen Delivery Method): room air        PHYSICAL EXAM:  GENERAL: NAD, well-groomed, well-developed  CHEST/LUNG: Clear to auscultation bilaterally; No rales, rhonchi, wheezing, or rubs  HEART: Regular rate and rhythm; No murmurs, rubs, or gallops  ABDOMEN: Soft, Nontender, Nondistended; Bowel sounds present.    PSYCH: Alert & Oriented x3        LABS:                        13.3   7.61  )-----------( 205      ( 21 Aug 2024 08:14 )             42.0     08-22    140  |  106  |  21  ----------------------------<  256<H>  3.7   |  22  |  0.70    Ca    9.5      22 Aug 2024 09:46  Phos  3.7     08-22  Mg     1.8     08-22    TPro  7.2  /  Alb  4.9  /  TBili  0.2  /  DBili  x   /  AST  11  /  ALT  15  /  AlkPhos  140<H>  08-22      Urinalysis Basic - ( 22 Aug 2024 09:46 )    Color: x / Appearance: x / SG: x / pH: x  Gluc: 256 mg/dL / Ketone: x  / Bili: x / Urobili: x   Blood: x / Protein: x / Nitrite: x   Leuk Esterase: x / RBC: x / WBC x   Sq Epi: x / Non Sq Epi: x / Bacteria: x      CAPILLARY BLOOD GLUCOSE      POCT Blood Glucose.: 222 mg/dL (22 Aug 2024 21:24)  POCT Blood Glucose.: 162 mg/dL (22 Aug 2024 17:14)  POCT Blood Glucose.: 137 mg/dL (22 Aug 2024 14:14)  POCT Blood Glucose.: 130 mg/dL (22 Aug 2024 12:09)  POCT Blood Glucose.: 252 mg/dL (22 Aug 2024 08:05)        Urinalysis Basic - ( 22 Aug 2024 09:46 )    Color: x / Appearance: x / SG: x / pH: x  Gluc: 256 mg/dL / Ketone: x  / Bili: x / Urobili: x   Blood: x / Protein: x / Nitrite: x   Leuk Esterase: x / RBC: x / WBC x   Sq Epi: x / Non Sq Epi: x / Bacteria: x        RADIOLOGY & ADDITIONAL TESTS:    Imaging Personally Reviewed:  [ ] YES  [ ] NO Lino Metcalf MD  Division of Hospital Medicine  Available via MS teams  ---------------------------------------------------------    WALDEMAR DUNCAN  53y  Female      Patient is a 53y old  Female who presents with a chief complaint of HHS (22 Aug 2024 14:15)      INTERVAL HPI/OVERNIGHT EVENTS:  Seen at bedside. Vision continues to improve      REVIEW OF SYSTEMS: 10 point ROS negative unless listed above    T(C): 36.4 (08-23-24 @ 04:53), Max: 36.8 (08-22-24 @ 12:46)  HR: 53 (08-23-24 @ 04:53) (53 - 73)  BP: 128/85 (08-23-24 @ 04:53) (122/86 - 142/90)  RR: 18 (08-23-24 @ 04:53) (18 - 18)  SpO2: 98% (08-23-24 @ 04:53) (98% - 99%)  Wt(kg): --Vital Signs Last 24 Hrs  T(C): 36.4 (23 Aug 2024 04:53), Max: 36.8 (22 Aug 2024 12:46)  T(F): 97.6 (23 Aug 2024 04:53), Max: 98.3 (22 Aug 2024 21:30)  HR: 53 (23 Aug 2024 04:53) (53 - 73)  BP: 128/85 (23 Aug 2024 04:53) (122/86 - 142/90)  BP(mean): --  RR: 18 (23 Aug 2024 04:53) (18 - 18)  SpO2: 98% (23 Aug 2024 04:53) (98% - 99%)    Parameters below as of 23 Aug 2024 04:53  Patient On (Oxygen Delivery Method): room air        PHYSICAL EXAM:  GENERAL: NAD, well-groomed, well-developed  CHEST/LUNG: Clear to auscultation bilaterally; No rales, rhonchi, wheezing, or rubs  HEART: Regular rate and rhythm; No murmurs, rubs, or gallops  ABDOMEN: Soft, Nontender, Nondistended; Bowel sounds present.    PSYCH: Alert & Oriented x3        LABS:                        13.3   7.61  )-----------( 205      ( 21 Aug 2024 08:14 )             42.0     08-22    140  |  106  |  21  ----------------------------<  256<H>  3.7   |  22  |  0.70    Ca    9.5      22 Aug 2024 09:46  Phos  3.7     08-22  Mg     1.8     08-22    TPro  7.2  /  Alb  4.9  /  TBili  0.2  /  DBili  x   /  AST  11  /  ALT  15  /  AlkPhos  140<H>  08-22      Urinalysis Basic - ( 22 Aug 2024 09:46 )    Color: x / Appearance: x / SG: x / pH: x  Gluc: 256 mg/dL / Ketone: x  / Bili: x / Urobili: x   Blood: x / Protein: x / Nitrite: x   Leuk Esterase: x / RBC: x / WBC x   Sq Epi: x / Non Sq Epi: x / Bacteria: x      CAPILLARY BLOOD GLUCOSE      POCT Blood Glucose.: 222 mg/dL (22 Aug 2024 21:24)  POCT Blood Glucose.: 162 mg/dL (22 Aug 2024 17:14)  POCT Blood Glucose.: 137 mg/dL (22 Aug 2024 14:14)  POCT Blood Glucose.: 130 mg/dL (22 Aug 2024 12:09)  POCT Blood Glucose.: 252 mg/dL (22 Aug 2024 08:05)        Urinalysis Basic - ( 22 Aug 2024 09:46 )    Color: x / Appearance: x / SG: x / pH: x  Gluc: 256 mg/dL / Ketone: x  / Bili: x / Urobili: x   Blood: x / Protein: x / Nitrite: x   Leuk Esterase: x / RBC: x / WBC x   Sq Epi: x / Non Sq Epi: x / Bacteria: x        RADIOLOGY & ADDITIONAL TESTS:    Imaging Personally Reviewed:  [ ] YES  [ ] NO

## 2024-08-23 NOTE — DISCHARGE NOTE PROVIDER - NSFOLLOWUPCLINICS_GEN_ALL_ED_FT
[facility] - Primary Care  Primary Care  [address] [place] [address]  Phone: [phone]  Fax:   Follow Up Time: Routine

## 2024-08-23 NOTE — DISCHARGE NOTE PROVIDER - PROVIDER TOKENS
ams from home, uti FREE:[LAST:[la],FIRST:[pricila],PHONE:[(   )    -],FAX:[(   )    -],ADDRESS:[Patient's endocrine]]

## 2024-08-23 NOTE — DISCHARGE NOTE PROVIDER - HOSPITAL COURSE
HPI:  53F with hx of HTN, renal cancer s/p nephrectomy (states cured, never received chemo/radiation), chronic neck pain, preDM, who presents to the hospital with polyuria and polydipsia. States that she has been eating lots of sweets as a coping mechanism given many recent stressors in her life. She additionally states she received a steroid injection in her cervical spine this past Thursday which she feels may have exacerbated her symptoms. States she was urinating what felt like constantly in the days leading up to admission. She was unable to check her glucose at home. Has been told she has preDM that was diet controlled. Isn't sure what her HbA1c is. Her symptoms have improved markedly since receiving fluids and insulin in the ED. (20 Aug 2024 12:53)    Hospital Course:      Important Medication Changes and Reason:    Active or Pending Issues Requiring Follow-up:    Advanced Directives:   [ ] Full code  [ ] DNR  [ ] Hospice    Discharge Diagnoses:         HPI:  53F with hx of HTN, renal cancer s/p nephrectomy (states cured, never received chemo/radiation), chronic neck pain, preDM, who presents to the hospital with polyuria and polydipsia. States that she has been eating lots of sweets as a coping mechanism given many recent stressors in her life. She additionally states she received a steroid injection in her cervical spine this past Thursday which she feels may have exacerbated her symptoms. States she was urinating what felt like constantly in the days leading up to admission. She was unable to check her glucose at home. Has been told she has preDM that was diet controlled. Isn't sure what her HbA1c is. Her symptoms have improved markedly since receiving fluids and insulin in the ED. (20 Aug 2024 12:53)    Hospital Course:    - Discharge DM medications: basal insulin 36 units at bedtime + bolus insulin pen 12 units TIDAC + metformin  mg BID with meals (If tolerating after 1 week, increase to 2 tabs twice a day with meals).   - Patient will follow up  her outpatient endocrinologist Dr. Raquel Georges outpatient, she has appt scheduled for next Thursday   Important Medication Changes and Reason:    Active or Pending Issues Requiring Follow-up:    Advanced Directives:   [ ] Full code  [ ] DNR  [ ] Hospice    Discharge Diagnoses:         HPI:  53F with hx of HTN, renal cancer s/p nephrectomy (states cured, never received chemo/radiation), chronic neck pain, preDM, who presents to the hospital with polyuria and polydipsia. States that she has been eating lots of sweets as a coping mechanism given many recent stressors in her life. She additionally states she received a steroid injection in her cervical spine this past Thursday which she feels may have exacerbated her symptoms. States she was urinating what felt like constantly in the days leading up to admission. She was unable to check her glucose at home. Has been told she has preDM that was diet controlled. Isn't sure what her HbA1c is. Her symptoms have improved markedly since receiving fluids and insulin in the ED. (20 Aug 2024 12:53)    Hospital Course:  Admitted with Type 2 diabetes mellitus with hyperosmolar hyperglycemic state (HHS). A1c 12.6. Endocrine consulted. Started insulin Ac and lantus HS. Blood sugar is now stable.    Discharge DM medications: basal insulin 36 units at bedtime + bolus insulin pen 12 units TIDAC + metformin  mg BID with meals (If tolerating after 1 week, increase to 2 tabs twice a day with meals).   - Patient will follow up  her outpatient endocrinologist Dr. Raquel Georges outpatient, she has appt scheduled for next Thursday   Patient is cleared for discharge home with out patient follow up with endocrine.     Important Medication Changes and Reason:       Active or Pending Issues Requiring Follow-up:    Advanced Directives:   [ x] Full code  [ ] DNR  [ ] Hospice    Discharge Diagnoses:  Uncontrolled diabetes         HPI:  53F with hx of HTN, renal cancer s/p nephrectomy (states cured, never received chemo/radiation), chronic neck pain, preDM, who presents to the hospital with polyuria and polydipsia. States that she has been eating lots of sweets as a coping mechanism given many recent stressors in her life. She additionally states she received a steroid injection in her cervical spine this past Thursday which she feels may have exacerbated her symptoms. States she was urinating what felt like constantly in the days leading up to admission. She was unable to check her glucose at home. Has been told she has preDM that was diet controlled. Isn't sure what her HbA1c is. Her symptoms have improved markedly since receiving fluids and insulin in the ED. (20 Aug 2024 12:53)    Hospital Course:  Admitted with Type 2 diabetes mellitus with hyperosmolar hyperglycemic state (HHS). A1c 12.6. Endocrine consulted. Started insulin Ac and lantus HS. Blood sugar is now stable.    Discharge DM medications: basal insulin 36 units at bedtime + bolus insulin pen 12 units TIDAC + metformin  mg BID with meals (If tolerating after 1 week, increase to 2 tabs twice a day with meals).   - Patient will follow up  her outpatient endocrinologist Dr. Raquel Georges outpatient, she has appt scheduled for next Thursday   Patient is cleared for discharge home with out patient follow up with endocrine.     Important Medication Changes and Reason:       Active or Pending Issues Requiring Follow-up:  Follow up with Endocrine   Follow up with PCP     Advanced Directives:   [ x] Full code  [ ] DNR  [ ] Hospice    Discharge Diagnoses:  Uncontrolled diabetes

## 2024-08-23 NOTE — PROGRESS NOTE ADULT - SUBJECTIVE AND OBJECTIVE BOX
ENDOCRINE FOLLOW UP     Chief Complaint: dm    History: BG levels improving, fasting glucose < 200 this morning although still above goal. Lantus increased yesterday. Patient received insulin teaching with RN. Still apprehensive about needles but agreeable to insulin on dc. Would like CGM.     MEDICATIONS  (STANDING):  amLODIPine   Tablet 2.5 milliGRAM(s) Oral daily  atorvastatin 20 milliGRAM(s) Oral at bedtime  dextrose 5%. 1000 milliLiter(s) (100 mL/Hr) IV Continuous <Continuous>  dextrose 5%. 1000 milliLiter(s) (50 mL/Hr) IV Continuous <Continuous>  dextrose 50% Injectable 25 Gram(s) IV Push once  dextrose 50% Injectable 25 Gram(s) IV Push once  dextrose 50% Injectable 12.5 Gram(s) IV Push once  enoxaparin Injectable 40 milliGRAM(s) SubCutaneous every 12 hours  glucagon  Injectable 1 milliGRAM(s) IntraMuscular once  insulin glargine Injectable (LANTUS) 36 Unit(s) SubCutaneous at bedtime  insulin lispro (ADMELOG) corrective regimen sliding scale   SubCutaneous three times a day before meals  insulin lispro (ADMELOG) corrective regimen sliding scale   SubCutaneous at bedtime  insulin lispro Injectable (ADMELOG) 13 Unit(s) SubCutaneous three times a day before meals  lactobacillus acidophilus 1 Tablet(s) Oral daily  multivitamin 1 Tablet(s) Oral daily  nebivolol 10 milliGRAM(s) Oral daily    MEDICATIONS  (PRN):  acetaminophen     Tablet .. 650 milliGRAM(s) Oral every 6 hours PRN Temp greater or equal to 38C (100.4F), Mild Pain (1 - 3)  aluminum hydroxide/magnesium hydroxide/simethicone Suspension 30 milliLiter(s) Oral every 4 hours PRN Dyspepsia  dextrose Oral Gel 15 Gram(s) Oral once PRN Blood Glucose LESS THAN 70 milliGRAM(s)/deciliter  melatonin 3 milliGRAM(s) Oral at bedtime PRN Insomnia  ondansetron Injectable 4 milliGRAM(s) IV Push every 8 hours PRN Nausea and/or Vomiting      Allergies    penicillins (Hives)  aspirin (Unknown)  penicillin (Unknown)  NSAIDs (Other)  latex (Other)    Intolerances        ROS: All other systems reviewed and negative    PHYSICAL EXAM:  VITALS: T(C): 36.4 (08-23-24 @ 04:53)  T(F): 97.6 (08-23-24 @ 04:53), Max: 98.3 (08-22-24 @ 21:30)  HR: 64 (08-23-24 @ 09:17) (53 - 73)  BP: 126/82 (08-23-24 @ 09:17) (122/86 - 140/76)  RR:  (18 - 18)  SpO2:  (95% - 99%)  Wt(kg): --  GENERAL: NAD, resting comfortably   EYES: No proptosis,  anicteric  HEENT:  Atraumatic, Normocephalic, moist mucous membranes  RESPIRATORY: Nonlabored respirations on room air, normal rate/effort   NEURO: AOx3, moves all extremities spontaenuously   PSYCH:  reactive affect, euthymic mood    POCT Blood Glucose.: 191 mg/dL (08-23-24 @ 09:50)  POCT Blood Glucose.: 191 mg/dL (08-23-24 @ 08:22)  POCT Blood Glucose.: 222 mg/dL (08-22-24 @ 21:24)  POCT Blood Glucose.: 162 mg/dL (08-22-24 @ 17:14)  POCT Blood Glucose.: 137 mg/dL (08-22-24 @ 14:14)  POCT Blood Glucose.: 130 mg/dL (08-22-24 @ 12:09)  POCT Blood Glucose.: 252 mg/dL (08-22-24 @ 08:05)  POCT Blood Glucose.: 216 mg/dL (08-21-24 @ 21:45)  POCT Blood Glucose.: 237 mg/dL (08-21-24 @ 17:10)  POCT Blood Glucose.: 216 mg/dL (08-21-24 @ 12:16)  POCT Blood Glucose.: 286 mg/dL (08-21-24 @ 08:15)  POCT Blood Glucose.: 295 mg/dL (08-20-24 @ 22:03)  POCT Blood Glucose.: 231 mg/dL (08-20-24 @ 16:55)  POCT Blood Glucose.: 234 mg/dL (08-20-24 @ 12:06)        eMAR:  insulin glargine Injectable (LANTUS)   36 Unit(s) SubCutaneous (08-22-24 @ 21:54)    insulin lispro (ADMELOG) corrective regimen sliding scale   1 Unit(s) SubCutaneous (08-23-24 @ 10:05)   1 Unit(s) SubCutaneous (08-22-24 @ 17:42)    insulin lispro Injectable (ADMELOG)   13 Unit(s) SubCutaneous (08-23-24 @ 10:05)   13 Unit(s) SubCutaneous (08-22-24 @ 17:42)   13 Unit(s) SubCutaneous (08-22-24 @ 12:17)        08-22    140  |  106  |  21  ----------------------------<  256<H>  3.7   |  22  |  0.70    eGFR: 104    Ca    9.5      08-22  Mg     1.8     08-22  Phos  3.7     08-22    TPro  7.2  /  Alb  4.9  /  TBili  0.2  /  DBili  x   /  AST  11  /  ALT  15  /  AlkPhos  140<H>  08-22      A1C with Estimated Average Glucose Result: 12.6 % (08-20-24 @ 10:21)

## 2024-08-23 NOTE — CHART NOTE - NSCHARTNOTEFT_GEN_A_CORE
53 F with history of prediabetes, RCC s/p L nephrectomy here for polyuria. Found to have A1C 12.6.    Patient was seen for DM management education   Reviewed the following with patient     -A1c LEVEL: Present and goal  -Blood glucose goals: 100s to 150s as out pt  -Glucose monitoring frequency: ac and hs  -Hypoglycemia prevention, detection and treatment ( 15 : 15 rule )  -Healthy eating and portion control  -Insulin(s) action, time of administration and side effects.   -Importance of follow up care ( has follow up appointment with endocrinologist on next Thursday )   -Reviewed use of insulin pen.  -patient was interested in CGM use. Freestyle Afshan 3 applied and reviewed the use of freestyle Afshan 3   Pt able to give teach back with no assistance    Discharge recommendation discussed with patient. She agrees with basal insulin and Metformin but does not want to start GLP1 stating that her  had bowel obstruction from GLP1.   Dr. Arenas aware of pt declining GLP1, recommended basal and bolus plus Metformin      Contact via [employer] Teams during business hours  To reach covering provider access AMION via sunrise tools  For Urgent matters/after-hours/weekends/holidays please page endocrine fellow on call   For nonurgent matters please email [email or web address]    Please note that this patient may be followed by different provider tomorrow.  Notify endocrine 24 hours prior to discharge for final recommendations

## 2024-08-24 LAB
GLUCOSE BLDC GLUCOMTR-MCNC: 106 MG/DL — HIGH (ref 70–99)
GLUCOSE BLDC GLUCOMTR-MCNC: 143 MG/DL — HIGH (ref 70–99)
GLUCOSE BLDC GLUCOMTR-MCNC: 181 MG/DL — HIGH (ref 70–99)
GLUCOSE BLDC GLUCOMTR-MCNC: 201 MG/DL — HIGH (ref 70–99)

## 2024-08-24 PROCEDURE — 99232 SBSQ HOSP IP/OBS MODERATE 35: CPT

## 2024-08-24 RX ORDER — INSULIN GLARGINE 100 [IU]/ML
40 INJECTION, SOLUTION SUBCUTANEOUS AT BEDTIME
Refills: 0 | Status: DISCONTINUED | OUTPATIENT
Start: 2024-08-24 | End: 2024-08-25

## 2024-08-24 RX ORDER — METFORMIN HYDROCHLORIDE 850 MG/1
1 TABLET, FILM COATED ORAL
Qty: 60 | Refills: 0
Start: 2024-08-24

## 2024-08-24 RX ORDER — INSULIN GLARGINE 100 [IU]/ML
36 INJECTION, SOLUTION SUBCUTANEOUS
Qty: 1 | Refills: 0
Start: 2024-08-24 | End: 2024-09-22

## 2024-08-24 RX ORDER — BENZOCAINE .06; .7 ML/1; ML/1
0 SWAB TOPICAL
Qty: 100 | Refills: 1
Start: 2024-08-24

## 2024-08-24 RX ADMIN — NEBIVOLOL 10 MILLIGRAM(S): 20 TABLET ORAL at 21:59

## 2024-08-24 RX ADMIN — Medication 1 TABLET(S): at 12:18

## 2024-08-24 RX ADMIN — AMLODIPINE BESYLATE 2.5 MILLIGRAM(S): 10 TABLET ORAL at 21:59

## 2024-08-24 RX ADMIN — Medication 13 UNIT(S): at 17:59

## 2024-08-24 RX ADMIN — Medication 1 TABLET(S): at 12:19

## 2024-08-24 RX ADMIN — Medication 2: at 08:59

## 2024-08-24 RX ADMIN — Medication 1: at 18:00

## 2024-08-24 RX ADMIN — Medication 13 UNIT(S): at 11:45

## 2024-08-24 RX ADMIN — Medication 13 UNIT(S): at 08:58

## 2024-08-24 RX ADMIN — INSULIN GLARGINE 40 UNIT(S): 100 INJECTION, SOLUTION SUBCUTANEOUS at 21:59

## 2024-08-24 NOTE — PROGRESS NOTE ADULT - PROBLEM SELECTOR PLAN 5
DVT ppx - Lovenox  Diet - CC  Dispo - d/c planning  Code status - full code    Stable for d/c should pt have received adequate diabetes teaching and comfortable with injecting insulin    d/c time 36 minutes

## 2024-08-24 NOTE — CHART NOTE - NSCHARTNOTEFT_GEN_A_CORE
POCT Blood Glucose:  106 mg/dL (08-24-24 @ 11:45)  201 mg/dL (08-24-24 @ 08:25)  158 mg/dL (08-23-24 @ 22:15)  124 mg/dL (08-23-24 @ 17:27)      eMAR:atorvastatin   20 milliGRAM(s) Oral (08-23-24 @ 22:26)    insulin glargine Injectable (LANTUS)   36 Unit(s) SubCutaneous (08-23-24 @ 22:26)    insulin lispro (ADMELOG) corrective regimen sliding scale   2 Unit(s) SubCutaneous (08-24-24 @ 08:59)    insulin lispro Injectable (ADMELOG)   13 Unit(s) SubCutaneous (08-24-24 @ 08:58)   13 Unit(s) SubCutaneous (08-23-24 @ 17:56)    Diet, Regular:   Consistent Carbohydrate {No Snacks} (CSTCHO) (08-20-24 @ 12:04) [Active]    BGs and insulin regimen reviewed   Last 24 hour BGs 100s-201 with fasting hyperglycemia ( )     - Will increase Lantus to 40 units at HS       Contact via Microsoft Teams during business hours  To reach covering provider access AMION via sunrise tools  For Urgent matters/after-hours/weekends/holidays please page endocrine fellow on call   For nonurgent matters please email [email or web address]    Please note that this patient may be followed by different provider tomorrow.  Notify endocrine 24 hours prior to discharge for final recommendations

## 2024-08-24 NOTE — PROGRESS NOTE ADULT - SUBJECTIVE AND OBJECTIVE BOX
Lino Metcalf MD  Division of Hospital Medicine  Available via MS teams  ---------------------------------------------------------    WALDEMAR DUNCAN  53y  Female      Patient is a 53y old  Female who presents with a chief complaint of HHS (23 Aug 2024 12:33)      INTERVAL HPI/OVERNIGHT EVENTS:  INCOMPLETE      REVIEW OF SYSTEMS: 10 point ROS negative unless listed above    T(C): 36.9 (08-24-24 @ 05:10), Max: 37.3 (08-24-24 @ 00:08)  HR: 72 (08-24-24 @ 05:10) (64 - 79)  BP: 145/94 (08-24-24 @ 05:10) (119/61 - 154/97)  RR: 18 (08-24-24 @ 05:10) (18 - 18)  SpO2: 93% (08-24-24 @ 05:10) (93% - 99%)  Wt(kg): --Vital Signs Last 24 Hrs  T(C): 36.9 (24 Aug 2024 05:10), Max: 37.3 (24 Aug 2024 00:08)  T(F): 98.5 (24 Aug 2024 05:10), Max: 99.1 (24 Aug 2024 00:08)  HR: 72 (24 Aug 2024 05:10) (64 - 79)  BP: 145/94 (24 Aug 2024 05:10) (119/61 - 154/97)  BP(mean): --  RR: 18 (24 Aug 2024 05:10) (18 - 18)  SpO2: 93% (24 Aug 2024 05:10) (93% - 99%)    Parameters below as of 24 Aug 2024 05:10  Patient On (Oxygen Delivery Method): room air        PHYSICAL EXAM:  GENERAL: NAD, well-groomed, well-developed  CHEST/LUNG: Clear to auscultation bilaterally; No rales, rhonchi, wheezing, or rubs  HEART: Regular rate and rhythm; No murmurs, rubs, or gallops  ABDOMEN: Soft, Nontender, Nondistended; Bowel sounds present.    PSYCH: Alert & Oriented x3        LABS:    08-22    140  |  106  |  21  ----------------------------<  256<H>  3.7   |  22  |  0.70    Ca    9.5      22 Aug 2024 09:46  Phos  3.7     08-22  Mg     1.8     08-22    TPro  7.2  /  Alb  4.9  /  TBili  0.2  /  DBili  x   /  AST  11  /  ALT  15  /  AlkPhos  140<H>  08-22      Urinalysis Basic - ( 22 Aug 2024 09:46 )    Color: x / Appearance: x / SG: x / pH: x  Gluc: 256 mg/dL / Ketone: x  / Bili: x / Urobili: x   Blood: x / Protein: x / Nitrite: x   Leuk Esterase: x / RBC: x / WBC x   Sq Epi: x / Non Sq Epi: x / Bacteria: x      CAPILLARY BLOOD GLUCOSE      POCT Blood Glucose.: 158 mg/dL (23 Aug 2024 22:15)  POCT Blood Glucose.: 124 mg/dL (23 Aug 2024 17:27)  POCT Blood Glucose.: 133 mg/dL (23 Aug 2024 13:02)  POCT Blood Glucose.: 191 mg/dL (23 Aug 2024 09:50)  POCT Blood Glucose.: 191 mg/dL (23 Aug 2024 08:22)        Urinalysis Basic - ( 22 Aug 2024 09:46 )    Color: x / Appearance: x / SG: x / pH: x  Gluc: 256 mg/dL / Ketone: x  / Bili: x / Urobili: x   Blood: x / Protein: x / Nitrite: x   Leuk Esterase: x / RBC: x / WBC x   Sq Epi: x / Non Sq Epi: x / Bacteria: x        RADIOLOGY & ADDITIONAL TESTS:    Imaging Personally Reviewed:  [ ] YES  [ ] NO Lino Metcalf MD  Division of Hospital Medicine  Available via MS teams  ---------------------------------------------------------    WALDEMAR DUNCAN  53y  Female      Patient is a 53y old  Female who presents with a chief complaint of HHS (23 Aug 2024 12:33)      INTERVAL HPI/OVERNIGHT EVENTS:  Seen at bedside. Still not completely comfortable with insulin      REVIEW OF SYSTEMS: 10 point ROS negative unless listed above    T(C): 36.9 (08-24-24 @ 05:10), Max: 37.3 (08-24-24 @ 00:08)  HR: 72 (08-24-24 @ 05:10) (64 - 79)  BP: 145/94 (08-24-24 @ 05:10) (119/61 - 154/97)  RR: 18 (08-24-24 @ 05:10) (18 - 18)  SpO2: 93% (08-24-24 @ 05:10) (93% - 99%)  Wt(kg): --Vital Signs Last 24 Hrs  T(C): 36.9 (24 Aug 2024 05:10), Max: 37.3 (24 Aug 2024 00:08)  T(F): 98.5 (24 Aug 2024 05:10), Max: 99.1 (24 Aug 2024 00:08)  HR: 72 (24 Aug 2024 05:10) (64 - 79)  BP: 145/94 (24 Aug 2024 05:10) (119/61 - 154/97)  BP(mean): --  RR: 18 (24 Aug 2024 05:10) (18 - 18)  SpO2: 93% (24 Aug 2024 05:10) (93% - 99%)    Parameters below as of 24 Aug 2024 05:10  Patient On (Oxygen Delivery Method): room air        PHYSICAL EXAM:  GENERAL: NAD, well-groomed, well-developed  CHEST/LUNG: Clear to auscultation bilaterally; No rales, rhonchi, wheezing, or rubs  HEART: Regular rate and rhythm; No murmurs, rubs, or gallops  ABDOMEN: Soft, Nontender, Nondistended; Bowel sounds present.    PSYCH: Alert & Oriented x3        LABS:    08-22    140  |  106  |  21  ----------------------------<  256<H>  3.7   |  22  |  0.70    Ca    9.5      22 Aug 2024 09:46  Phos  3.7     08-22  Mg     1.8     08-22    TPro  7.2  /  Alb  4.9  /  TBili  0.2  /  DBili  x   /  AST  11  /  ALT  15  /  AlkPhos  140<H>  08-22      Urinalysis Basic - ( 22 Aug 2024 09:46 )    Color: x / Appearance: x / SG: x / pH: x  Gluc: 256 mg/dL / Ketone: x  / Bili: x / Urobili: x   Blood: x / Protein: x / Nitrite: x   Leuk Esterase: x / RBC: x / WBC x   Sq Epi: x / Non Sq Epi: x / Bacteria: x      CAPILLARY BLOOD GLUCOSE      POCT Blood Glucose.: 158 mg/dL (23 Aug 2024 22:15)  POCT Blood Glucose.: 124 mg/dL (23 Aug 2024 17:27)  POCT Blood Glucose.: 133 mg/dL (23 Aug 2024 13:02)  POCT Blood Glucose.: 191 mg/dL (23 Aug 2024 09:50)  POCT Blood Glucose.: 191 mg/dL (23 Aug 2024 08:22)        Urinalysis Basic - ( 22 Aug 2024 09:46 )    Color: x / Appearance: x / SG: x / pH: x  Gluc: 256 mg/dL / Ketone: x  / Bili: x / Urobili: x   Blood: x / Protein: x / Nitrite: x   Leuk Esterase: x / RBC: x / WBC x   Sq Epi: x / Non Sq Epi: x / Bacteria: x        RADIOLOGY & ADDITIONAL TESTS:    Imaging Personally Reviewed:  [ ] YES  [ ] NO

## 2024-08-24 NOTE — PROGRESS NOTE ADULT - PROBLEM SELECTOR PLAN 1
Presenting with uncontrolled BG and HHS  - A1c 12.6  - endocrine following, on Lantus 36u at bedtime, Lispro 13 units TIDAC  - Discharge med's:  basal insulin 36 units at bedtime + Lispro 13u TIDAC+ metformin  mg BID with meals (If tolerating after 1 week, increase to 2 tabs twice a day with meals). Pt declining ozempic  as  had side effect from that medication  - CC diet  - ISS qac and qhs  - Endo f/u for d/c medication regimen  - Diabetic education, nutrition eval

## 2024-08-25 VITALS
DIASTOLIC BLOOD PRESSURE: 75 MMHG | HEART RATE: 65 BPM | TEMPERATURE: 98 F | SYSTOLIC BLOOD PRESSURE: 118 MMHG | RESPIRATION RATE: 18 BRPM | OXYGEN SATURATION: 100 %

## 2024-08-25 LAB
GLUCOSE BLDC GLUCOMTR-MCNC: 132 MG/DL — HIGH (ref 70–99)
GLUCOSE BLDC GLUCOMTR-MCNC: 150 MG/DL — HIGH (ref 70–99)

## 2024-08-25 PROCEDURE — 36415 COLL VENOUS BLD VENIPUNCTURE: CPT

## 2024-08-25 PROCEDURE — 83605 ASSAY OF LACTIC ACID: CPT

## 2024-08-25 PROCEDURE — 82330 ASSAY OF CALCIUM: CPT

## 2024-08-25 PROCEDURE — 84100 ASSAY OF PHOSPHORUS: CPT

## 2024-08-25 PROCEDURE — 82010 KETONE BODYS QUAN: CPT

## 2024-08-25 PROCEDURE — 99239 HOSP IP/OBS DSCHRG MGMT >30: CPT

## 2024-08-25 PROCEDURE — 82962 GLUCOSE BLOOD TEST: CPT

## 2024-08-25 PROCEDURE — 80061 LIPID PANEL: CPT

## 2024-08-25 PROCEDURE — 87086 URINE CULTURE/COLONY COUNT: CPT

## 2024-08-25 PROCEDURE — 84295 ASSAY OF SERUM SODIUM: CPT

## 2024-08-25 PROCEDURE — 82947 ASSAY GLUCOSE BLOOD QUANT: CPT

## 2024-08-25 PROCEDURE — 81001 URINALYSIS AUTO W/SCOPE: CPT

## 2024-08-25 PROCEDURE — 80053 COMPREHEN METABOLIC PANEL: CPT

## 2024-08-25 PROCEDURE — 85018 HEMOGLOBIN: CPT

## 2024-08-25 PROCEDURE — 99285 EMERGENCY DEPT VISIT HI MDM: CPT

## 2024-08-25 PROCEDURE — 80048 BASIC METABOLIC PNL TOTAL CA: CPT

## 2024-08-25 PROCEDURE — 97161 PT EVAL LOW COMPLEX 20 MIN: CPT

## 2024-08-25 PROCEDURE — 85025 COMPLETE CBC W/AUTO DIFF WBC: CPT

## 2024-08-25 PROCEDURE — 84702 CHORIONIC GONADOTROPIN TEST: CPT

## 2024-08-25 PROCEDURE — 83036 HEMOGLOBIN GLYCOSYLATED A1C: CPT

## 2024-08-25 PROCEDURE — 85014 HEMATOCRIT: CPT

## 2024-08-25 PROCEDURE — 82803 BLOOD GASES ANY COMBINATION: CPT

## 2024-08-25 PROCEDURE — 83735 ASSAY OF MAGNESIUM: CPT

## 2024-08-25 PROCEDURE — 84132 ASSAY OF SERUM POTASSIUM: CPT

## 2024-08-25 PROCEDURE — 82435 ASSAY OF BLOOD CHLORIDE: CPT

## 2024-08-25 RX ORDER — INSULIN GLARGINE 100 [IU]/ML
40 INJECTION, SOLUTION SUBCUTANEOUS
Qty: 1 | Refills: 0
Start: 2024-08-25 | End: 2024-09-23

## 2024-08-25 RX ADMIN — Medication 13 UNIT(S): at 12:32

## 2024-08-25 NOTE — PROGRESS NOTE ADULT - PROBLEM SELECTOR PLAN 4
Reports recent life stressors and depression, though able to speak with therapist and family for support.  - outpatient f/u

## 2024-08-25 NOTE — PROGRESS NOTE ADULT - PROBLEM SELECTOR PLAN 3
s/p recent steroid injection  - outpatient f/u

## 2024-08-25 NOTE — PROGRESS NOTE ADULT - PROBLEM SELECTOR PLAN 2
On Bystolic, amlodipine at home  - c/w Bystolic  - c/w home amlodipine

## 2024-08-25 NOTE — PROGRESS NOTE ADULT - PROBLEM SELECTOR PROBLEM 3
Chronic neck pain
Hyperlipidemia
Hyperlipidemia
n/a
Chronic neck pain
Chronic neck pain

## 2024-08-25 NOTE — PROGRESS NOTE ADULT - PROBLEM SELECTOR PLAN 1
Presenting with uncontrolled BG and HHS  - A1c 12.6  - endocrine following, on Lantus 40u at bedtime, Lispro 13 units TIDAC  - Discharge med's:  basal insulin 40 units at bedtime + Lispro 13u TIDAC+ metformin  mg BID with meals (If tolerating after 1 week, increase to 2 tabs twice a day with meals). Pt declining ozempic  as  had side effect from that medication  - CC diet  - ISS qac and qhs  - Endo f/u for d/c medication regimen  - Diabetic education, nutrition eval Presenting with uncontrolled BG and HHS  - A1c 12.6  - endocrine following, on Lantus 40u at bedtime, Lispro 13 units TIDAC  - Discharge med's:  basal insulin 36 units at bedtime + Lispro 13u TIDAC+ metformin  mg BID with meals (If tolerating after 1 week, increase to 2 tabs twice a day with meals). Pt declining ozempic as  had side effect from that medication  - CC diet  - ISS qac and qhs  - Endo f/u for d/c medication regimen  - Diabetic education, nutrition eval

## 2024-08-25 NOTE — PROGRESS NOTE ADULT - SUBJECTIVE AND OBJECTIVE BOX
Lino Metcalf MD  Division of Hospital Medicine  Available via MS teams  ---------------------------------------------------------    WALDEMAR DUNCAN  53y  Female      Patient is a 53y old  Female who presents with a chief complaint of HHS (24 Aug 2024 07:12)      INTERVAL HPI/OVERNIGHT EVENTS:  INCOMPLETE      REVIEW OF SYSTEMS: 10 point ROS negative unless listed above    T(C): 36.4 (08-25-24 @ 04:20), Max: 37.2 (08-25-24 @ 00:28)  HR: 75 (08-25-24 @ 04:20) (53 - 90)  BP: 152/96 (08-25-24 @ 04:20) (109/65 - 152/96)  RR: 19 (08-25-24 @ 04:20) (16 - 19)  SpO2: 100% (08-25-24 @ 04:20) (95% - 100%)  Wt(kg): --Vital Signs Last 24 Hrs  T(C): 36.4 (25 Aug 2024 04:20), Max: 37.2 (25 Aug 2024 00:28)  T(F): 97.6 (25 Aug 2024 04:20), Max: 98.9 (25 Aug 2024 00:28)  HR: 75 (25 Aug 2024 04:20) (53 - 90)  BP: 152/96 (25 Aug 2024 04:20) (109/65 - 152/96)  BP(mean): --  RR: 19 (25 Aug 2024 04:20) (16 - 19)  SpO2: 100% (25 Aug 2024 04:20) (95% - 100%)    Parameters below as of 25 Aug 2024 04:20  Patient On (Oxygen Delivery Method): room air        PHYSICAL EXAM:  GENERAL: NAD, well-groomed, well-developed  CHEST/LUNG: Clear to auscultation bilaterally; No rales, rhonchi, wheezing, or rubs  HEART: Regular rate and rhythm; No murmurs, rubs, or gallops  ABDOMEN: Soft, Nontender, Nondistended; Bowel sounds present.    PSYCH: Alert & Oriented x3        LABS:              CAPILLARY BLOOD GLUCOSE  106 (24 Aug 2024 11:45)      POCT Blood Glucose.: 143 mg/dL (24 Aug 2024 21:56)  POCT Blood Glucose.: 181 mg/dL (24 Aug 2024 17:19)  POCT Blood Glucose.: 106 mg/dL (24 Aug 2024 11:45)  POCT Blood Glucose.: 201 mg/dL (24 Aug 2024 08:25)            RADIOLOGY & ADDITIONAL TESTS:    Imaging Personally Reviewed:  [ ] YES  [ ] NO Lino Metcalf MD  Division of Hospital Medicine  Available via MS teams  ---------------------------------------------------------    WALDEMAR DUNCAN  53y  Female      Patient is a 53y old  Female who presents with a chief complaint of HHS (24 Aug 2024 07:12)      INTERVAL HPI/OVERNIGHT EVENTS:  Seen at [facility]. Still not super comfortable with insulin but wants to go home. Not much of an appetite      REVIEW OF SYSTEMS: 10 point ROS negative unless listed above    T(C): 36.4 (08-25-24 @ 04:20), Max: 37.2 (08-25-24 @ 00:28)  HR: 75 (08-25-24 @ 04:20) (53 - 90)  BP: 152/96 (08-25-24 @ 04:20) (109/65 - 152/96)  RR: 19 (08-25-24 @ 04:20) (16 - 19)  SpO2: 100% (08-25-24 @ 04:20) (95% - 100%)  Wt(kg): --Vital Signs Last 24 Hrs  T(C): 36.4 (25 Aug 2024 04:20), Max: 37.2 (25 Aug 2024 00:28)  T(F): 97.6 (25 Aug 2024 04:20), Max: 98.9 (25 Aug 2024 00:28)  HR: 75 (25 Aug 2024 04:20) (53 - 90)  BP: 152/96 (25 Aug 2024 04:20) (109/65 - 152/96)  BP(mean): --  RR: 19 (25 Aug 2024 04:20) (16 - 19)  SpO2: 100% (25 Aug 2024 04:20) (95% - 100%)    Parameters below as of 25 Aug 2024 04:20  Patient On (Oxygen Delivery Method): room air        PHYSICAL EXAM:  GENERAL: NAD, well-groomed, well-developed  CHEST/LUNG: Clear to auscultation bilaterally; No rales, rhonchi, wheezing, or rubs  HEART: Regular rate and rhythm; No murmurs, rubs, or gallops  ABDOMEN: Soft, Nontender, Nondistended; Bowel sounds present.    PSYCH: Alert & Oriented x3        LABS:              CAPILLARY BLOOD GLUCOSE  106 (24 Aug 2024 11:45)      POCT Blood Glucose.: 143 mg/dL (24 Aug 2024 21:56)  POCT Blood Glucose.: 181 mg/dL (24 Aug 2024 17:19)  POCT Blood Glucose.: 106 mg/dL (24 Aug 2024 11:45)  POCT Blood Glucose.: 201 mg/dL (24 Aug 2024 08:25)            RADIOLOGY & ADDITIONAL TESTS:    Imaging Personally Reviewed:  [ ] YES  [ ] NO

## 2024-08-25 NOTE — PROGRESS NOTE ADULT - PROVIDER SPECIALTY LIST ADULT
Endocrinology
Endocrinology
Internal Medicine
Endocrinology
Internal Medicine

## 2024-08-25 NOTE — PROGRESS NOTE ADULT - NUTRITIONAL ASSESSMENT
This patient has been assessed with a concern for Malnutrition and has been determined to have a diagnosis/diagnoses of Morbid obesity (BMI > 40).    This patient is being managed with:   Diet Regular-  Consistent Carbohydrate {No Snacks} (CSTCHO)  Entered: Aug 20 2024 12:04PM  

## 2024-08-25 NOTE — PROGRESS NOTE ADULT - PROBLEM SELECTOR PROBLEM 4
Depression, reactive

## 2024-08-25 NOTE — PROGRESS NOTE ADULT - PROBLEM SELECTOR PROBLEM 1
Type 2 diabetes mellitus with hyperosmolar hyperglycemic state (HHS)
Type 2 diabetes mellitus with hyperosmolar hyperglycemic state (HHS)
Uncontrolled type 2 diabetes mellitus with hyperglycemia
Type 2 diabetes mellitus with hyperosmolar hyperglycemic state (HHS)

## 2025-04-18 ENCOUNTER — APPOINTMENT (OUTPATIENT)
Dept: ORTHOPEDIC SURGERY | Facility: CLINIC | Age: 55
End: 2025-04-18
Payer: COMMERCIAL

## 2025-04-18 DIAGNOSIS — M25.569 PAIN IN UNSPECIFIED KNEE: ICD-10-CM

## 2025-04-18 DIAGNOSIS — M17.11 UNILATERAL PRIMARY OSTEOARTHRITIS, RIGHT KNEE: ICD-10-CM

## 2025-04-18 DIAGNOSIS — M17.0 BILATERAL PRIMARY OSTEOARTHRITIS OF KNEE: ICD-10-CM

## 2025-04-18 DIAGNOSIS — M17.12 UNILATERAL PRIMARY OSTEOARTHRITIS, LEFT KNEE: ICD-10-CM

## 2025-04-18 PROCEDURE — 73562 X-RAY EXAM OF KNEE 3: CPT | Mod: 50

## 2025-04-18 PROCEDURE — 99214 OFFICE O/P EST MOD 30 MIN: CPT

## 2025-05-27 ENCOUNTER — NON-APPOINTMENT (OUTPATIENT)
Age: 55
End: 2025-05-27

## 2025-05-29 ENCOUNTER — APPOINTMENT (OUTPATIENT)
Dept: UROLOGY | Facility: CLINIC | Age: 55
End: 2025-05-29
Payer: MEDICARE

## 2025-05-29 ENCOUNTER — NON-APPOINTMENT (OUTPATIENT)
Age: 55
End: 2025-05-29

## 2025-05-29 DIAGNOSIS — C64.9 MALIGNANT NEOPLASM OF UNSPECIFIED KIDNEY, EXCEPT RENAL PELVIS: ICD-10-CM

## 2025-05-29 PROCEDURE — 99214 OFFICE O/P EST MOD 30 MIN: CPT

## 2025-05-29 PROCEDURE — G2211 COMPLEX E/M VISIT ADD ON: CPT

## 2025-05-30 LAB
APPEARANCE: CLEAR
BACTERIA: ABNORMAL /HPF
BILIRUBIN URINE: NEGATIVE
BLOOD URINE: NEGATIVE
CAST: 6 /LPF
COLOR: NORMAL
EPITHELIAL CELLS: 13 /HPF
GLUCOSE QUALITATIVE U: NEGATIVE MG/DL
HYALINE CASTS: PRESENT
KETONES URINE: NEGATIVE MG/DL
LEUKOCYTE ESTERASE URINE: ABNORMAL
MICROSCOPIC-UA: NORMAL
MUCUS: PRESENT
NITRITE URINE: NEGATIVE
PH URINE: 5.5
PROTEIN URINE: NORMAL MG/DL
RED BLOOD CELLS URINE: 2 /HPF
REVIEW: NORMAL
SPECIFIC GRAVITY URINE: 1.03
UROBILINOGEN URINE: 0.2 MG/DL
WHITE BLOOD CELLS URINE: 6 /HPF

## 2025-06-01 LAB — BACTERIA UR CULT: NORMAL

## 2025-06-20 ENCOUNTER — APPOINTMENT (OUTPATIENT)
Dept: ORTHOPEDIC SURGERY | Facility: CLINIC | Age: 55
End: 2025-06-20
Payer: MEDICARE

## 2025-06-20 PROCEDURE — 99214 OFFICE O/P EST MOD 30 MIN: CPT

## 2025-07-15 ENCOUNTER — APPOINTMENT (OUTPATIENT)
Dept: ORTHOPEDIC SURGERY | Facility: CLINIC | Age: 55
End: 2025-07-15
Payer: MEDICARE

## 2025-07-15 PROCEDURE — 99214 OFFICE O/P EST MOD 30 MIN: CPT | Mod: 25

## 2025-07-15 PROCEDURE — 20611 DRAIN/INJ JOINT/BURSA W/US: CPT | Mod: 50

## 2025-07-22 ENCOUNTER — APPOINTMENT (OUTPATIENT)
Dept: ORTHOPEDIC SURGERY | Facility: CLINIC | Age: 55
End: 2025-07-22
Payer: MEDICARE

## 2025-07-22 PROCEDURE — 20611 DRAIN/INJ JOINT/BURSA W/US: CPT | Mod: 50

## 2025-07-29 ENCOUNTER — APPOINTMENT (OUTPATIENT)
Dept: ORTHOPEDIC SURGERY | Facility: CLINIC | Age: 55
End: 2025-07-29
Payer: MEDICARE

## 2025-07-29 DIAGNOSIS — M17.11 UNILATERAL PRIMARY OSTEOARTHRITIS, RIGHT KNEE: ICD-10-CM

## 2025-07-29 DIAGNOSIS — M17.0 BILATERAL PRIMARY OSTEOARTHRITIS OF KNEE: ICD-10-CM

## 2025-07-29 DIAGNOSIS — S83.282D OTHER TEAR OF LATERAL MENISCUS, CURRENT INJURY, LEFT KNEE, SUBSEQUENT ENCOUNTER: ICD-10-CM

## 2025-07-29 DIAGNOSIS — S83.241D OTHER TEAR OF MEDIAL MENISCUS, CURRENT INJURY, RIGHT KNEE, SUBSEQUENT ENCOUNTER: ICD-10-CM

## 2025-07-29 DIAGNOSIS — S83.281D OTHER TEAR OF LATERAL MENISCUS, CURRENT INJURY, RIGHT KNEE, SUBSEQUENT ENCOUNTER: ICD-10-CM

## 2025-07-29 DIAGNOSIS — M17.12 UNILATERAL PRIMARY OSTEOARTHRITIS, LEFT KNEE: ICD-10-CM

## 2025-07-29 PROCEDURE — 20611 DRAIN/INJ JOINT/BURSA W/US: CPT | Mod: 50

## 2025-07-29 PROCEDURE — 99214 OFFICE O/P EST MOD 30 MIN: CPT | Mod: 25
